# Patient Record
Sex: MALE | Race: WHITE | NOT HISPANIC OR LATINO | Employment: OTHER | URBAN - NONMETROPOLITAN AREA
[De-identification: names, ages, dates, MRNs, and addresses within clinical notes are randomized per-mention and may not be internally consistent; named-entity substitution may affect disease eponyms.]

---

## 2018-07-18 ENCOUNTER — TRANSCRIBE ORDERS (OUTPATIENT)
Dept: ADMINISTRATIVE | Facility: HOSPITAL | Age: 68
End: 2018-07-18

## 2018-07-18 DIAGNOSIS — R20.8 OTHER DISTURBANCES OF SKIN SENSATION: Primary | ICD-10-CM

## 2018-08-23 ENCOUNTER — HOSPITAL ENCOUNTER (OUTPATIENT)
Dept: NEUROLOGY | Facility: HOSPITAL | Age: 68
End: 2018-08-23

## 2018-08-29 ENCOUNTER — HOSPITAL ENCOUNTER (OUTPATIENT)
Dept: NEUROLOGY | Facility: HOSPITAL | Age: 68
Discharge: HOME | End: 2018-08-29
Admitting: FAMILY MEDICINE

## 2018-08-29 DIAGNOSIS — R20.8 OTHER DISTURBANCES OF SKIN SENSATION: ICD-10-CM

## 2018-08-29 PROCEDURE — 95886 MUSC TEST DONE W/N TEST COMP: CPT

## 2018-08-29 PROCEDURE — 95886 MUSC TEST DONE W/N TEST COMP: CPT | Performed by: PSYCHIATRY & NEUROLOGY

## 2018-08-29 PROCEDURE — 95911 NRV CNDJ TEST 9-10 STUDIES: CPT

## 2018-08-29 PROCEDURE — 95911 NRV CNDJ TEST 9-10 STUDIES: CPT | Performed by: PSYCHIATRY & NEUROLOGY

## 2018-12-26 ENCOUNTER — OFFICE VISIT (OUTPATIENT)
Dept: CARDIOLOGY | Facility: CLINIC | Age: 68
End: 2018-12-26

## 2018-12-26 VITALS
OXYGEN SATURATION: 99 % | HEIGHT: 73 IN | DIASTOLIC BLOOD PRESSURE: 72 MMHG | SYSTOLIC BLOOD PRESSURE: 120 MMHG | WEIGHT: 197 LBS | BODY MASS INDEX: 26.11 KG/M2 | HEART RATE: 70 BPM

## 2018-12-26 DIAGNOSIS — R61 NIGHT SWEATS: ICD-10-CM

## 2018-12-26 DIAGNOSIS — Z82.49 FAMILY HISTORY OF EARLY CAD: ICD-10-CM

## 2018-12-26 DIAGNOSIS — I39 ENDOCARDITIS AND HEART VALVE DISORDERS IN DISEASES CLASSIFIED ELSEWHERE: ICD-10-CM

## 2018-12-26 DIAGNOSIS — I10 ESSENTIAL HYPERTENSION: Primary | ICD-10-CM

## 2018-12-26 DIAGNOSIS — R05.9 COUGH: ICD-10-CM

## 2018-12-26 DIAGNOSIS — B27.80 OTHER INFECTIOUS MONONUCLEOSIS WITHOUT COMPLICATION: ICD-10-CM

## 2018-12-26 DIAGNOSIS — R50.9 FEVER CHILLS: ICD-10-CM

## 2018-12-26 PROCEDURE — 93000 ELECTROCARDIOGRAM COMPLETE: CPT | Performed by: INTERNAL MEDICINE

## 2018-12-26 PROCEDURE — 99204 OFFICE O/P NEW MOD 45 MIN: CPT | Performed by: INTERNAL MEDICINE

## 2018-12-26 RX ORDER — ACYCLOVIR 400 MG/1
400 TABLET ORAL 2 TIMES DAILY
COMMUNITY

## 2018-12-26 RX ORDER — DORZOLAMIDE HCL 20 MG/ML
1 SOLUTION/ DROPS OPHTHALMIC 3 TIMES DAILY
COMMUNITY

## 2018-12-26 RX ORDER — LISINOPRIL 20 MG/1
20 TABLET ORAL DAILY
COMMUNITY
End: 2020-05-08 | Stop reason: HOSPADM

## 2018-12-26 RX ORDER — FLUOROMETHOLONE 0.1 %
1 SUSPENSION, DROPS(FINAL DOSAGE FORM)(ML) OPHTHALMIC (EYE) WEEKLY
COMMUNITY

## 2018-12-26 NOTE — PROGRESS NOTES
Korey Ruelas  [id number]  1950  68 y.o.  male    Referring Provider: Jerica Schmitt MD    Reason for Follow-up Visit:  Initial visit for fever and chills  Essential Hypertension  Recent CXR normal per patient    Subjective    Mild chronic exertional shortness of breath on exertion relieved with rest  No significant cough or wheezing  Going on for several months    No palpitations  No associated chest pain  No significant pedal edema  Non exertional chest pain   Feels like a band  Family History   Problem Relation Age of Onset   • Heart disease Mother    • Heart disease Father        fever or chills and night sweats x 6 weeks  No significant expectoration  No hemoptysis  No presyncope or syncope       History of present illness:  Korey Ruelas is a 68 y.o. yo male with history of  Essential Hypertension   who presents today for   Chief Complaint   Patient presents with   • Chest Pain     NEW PT - RECENT ECHO/CXR - ACTIVE MONO INFECTION   • Headache   • Night Sweats   • Dizziness   .    History  Past Medical History:   Diagnosis Date   • Chest tightness    • Hypertension    • Kidney stones    • Mono exposure     CURRENT INFECTION   • Shingles    ,   Past Surgical History:   Procedure Laterality Date   • CATARACT EXTRACTION     • KNEE SURGERY      1974   ,   Family History   Problem Relation Age of Onset   • Heart disease Mother    • Heart disease Father    ,   Social History     Tobacco Use   • Smoking status: Never Smoker   • Smokeless tobacco: Never Used   Substance Use Topics   • Alcohol use: Yes   • Drug use: Defer   ,     Medications  Current Outpatient Medications   Medication Sig Dispense Refill   • acyclovir (ZOVIRAX) 400 MG tablet Take 400 mg by mouth 2 (Two) Times a Day. Take no more than 5 doses a day.     • Cyanocobalamin (B-12 PO) Take  by mouth Daily.     • dorzolamide (TRUSOPT) 2 % ophthalmic solution 1 drop 3 (Three) Times a Day.     • fluorometholone (FML) 0.1 % ophthalmic suspension 1  "drop Every 4 (Four) Hours.     • lisinopril (PRINIVIL,ZESTRIL) 20 MG tablet Take 20 mg by mouth Daily.     • NIACIN PO Take  by mouth Daily.       No current facility-administered medications for this visit.        Allergies:  Patient has no known allergies.    Review of Systems  Review of Systems   Constitution: Positive for chills, fever, weakness, malaise/fatigue and night sweats.   HENT: Negative.    Eyes: Negative.    Cardiovascular: Positive for chest pain and dyspnea on exertion. Negative for claudication, cyanosis, irregular heartbeat, leg swelling, near-syncope, orthopnea, palpitations, paroxysmal nocturnal dyspnea and syncope.   Respiratory: Negative.    Endocrine: Negative.    Hematologic/Lymphatic: Negative.    Skin: Negative.    Gastrointestinal: Negative for anorexia.   Genitourinary: Negative.    Psychiatric/Behavioral: Negative.        Objective     Physical Exam:  /72   Pulse 70   Ht 185.4 cm (73\")   Wt 89.4 kg (197 lb)   SpO2 99%   BMI 25.99 kg/m²     Physical Exam   Constitutional: He appears well-developed.   HENT:   Head: Normocephalic.   Neck: Normal carotid pulses and no JVD present. No tracheal tenderness present. Carotid bruit is not present. No tracheal deviation and no edema present.   Cardiovascular: Regular rhythm, normal heart sounds and normal pulses.   Pulmonary/Chest: Effort normal. No stridor.   Abdominal: Soft.   Neurological: He is alert. He has normal strength. No cranial nerve deficit or sensory deficit.   Skin: Skin is warm.   Psychiatric: He has a normal mood and affect. His speech is normal and behavior is normal.       Results Review:       ECG 12 Lead  Date/Time: 12/26/2018 10:18 AM  Performed by: Boo Wylie MD  Authorized by: Boo Wylie MD   Comparison: not compared with previous ECG   Rhythm: sinus rhythm  Rate: normal  Conduction: conduction normal  ST Segments: ST segments normal  T Waves: T waves normal  QRS axis: normal  Other: no other " "findings  Clinical impression: normal ECG            Assessment/Plan   Korey was seen today for chest pain, headache, night sweats and dizziness.    Diagnoses and all orders for this visit:    Essential hypertension    Night sweats  -     MILTON - Diagnostic    Cough    Fever chills  -     ECG 12 Lead  -     MILTON - Diagnostic    Other infectious mononucleosis without complication    Family history of early CAD  -     ECG 12 Lead             Plan          Orders Placed This Encounter   Procedures   • MILTON - Diagnostic   • ECG 12 Lead     This order was created via procedure documentation     Recommend ID consult   He will talk to primary now  He declined stress test  In past nuclear stress test in [place] was normal    xxxxxxxxxxxxxxxxxxxxxxxxxxxxxxxxxxxxxxxxxxxxxxxxxxxxxxxxxxxxxxxxxxxxxxxxxxxxxxxxxxxxxxxxxxxxxxxxxxxxxxxxxxxxxxxxxxxxxxxxxxxxxxxxxxxxxxxxxxxxxxxxxxxxxxxxxxxxxxxxxxxxxxxxxxxxxxxxxxxxxxxxxxxxxxxxxxxxxxxxxxxxxxxxxxxxxxxxxxxxxxxxxxxxxxxxxxxxxxxxxxxxxxxx  Health maintenance    Low salt/ HTN/ Heart healthy carbohydrate restricted cardiac diet as applicable to this patient's current diagnoses.   This handout has relevant information regarding shopping for food, preparing meals, what to eat at restaurants, tracking of food intake, information regarding sodium intake and salt content, how to read food labels, knowing what to eat, tips reagarding physical activity, calorie count and calorie expenditure. What foods to avoid. Information regarding alcoholic drinks along with \"good\" and \"bad\" fats.  Reiterated prior recommendations     The patient is advised to reduce or avoid caffeine or other cardiac stimulants.     The patient was advised that NSAID-type medications have three  very important potential side effects: cardiovascular complications, gastrointestinal irritation including hemorrhage and renal injuries.  Do not use anti-inflammatories such as Motrin/ibuprofen, Alleve/naprosyn, Mobic and like medications " Use Tylenol instead.The patient expresses understanding of these issues and questions were answered.   Monitor for any signs of bleeding including red or dark stools. Fall precautions.       Monitor for any signs of bleeding including red or dark stools. Fall precautions.   Patient is asked to monitor BP at home or work, several times per month and return with written values at next office visit.     Advised staying uptodate with immunizations per established standard guidelines.      Offered to give patient  a copy of my notes and relevant tests/ prior ECG etc for the patient to review and follow specific advise and relevant findings if any, prognosis, along with my current and future plans.      Questions were encouraged, asked and answered to the patient's  understanding and satisfaction. Questions if any regarding current medications and side effects, need for refills and importance of compliance to medications stressed.    Reviewed available prior notes, consults, prior visits, laboratory findings, radiology and cardiology relevant reports. Updated chart as applicable. I have reviewed the patient's medical history in detail and updated the computerized patient record as relevant.      Updated patient regarding any new or relevant abnormalities on review of records or any new findings on physical exam. Mentioned to patient about purpose of visit and desirable health short and long term goals and objectives.        xxxxxxxxxxxxxxxxxxxxxxxxxxxxxxxxxxxxxxxxxxxxxxxxxxxxxxxxxxxxxxxxxxxxxxxxxxxxxxxxxxxxxxxxxxxxxxxxxxxxxxxxxxxxxxxxxxxxxxxxxxxxxxxxxxxxxxxxxxxxxxxxxxxxxxxxxxxxxxxxxxxxxxxxxxxxxxxxxxxxxxxxxxxxxxxxxxxxxxxxxxxxxxxxxxxxxxxxxxxxxxxxxxxxxxxxxxxxxxxxxxxxxxxx    Return if symptoms worsen or fail to improve.  He declined a follow up  He wants to think about MILTON and let us know

## 2019-01-28 DIAGNOSIS — R50.9 FEVER, UNSPECIFIED FEVER CAUSE: Primary | ICD-10-CM

## 2019-01-28 DIAGNOSIS — I33.0: ICD-10-CM

## 2019-01-29 ENCOUNTER — HOSPITAL ENCOUNTER (OUTPATIENT)
Dept: CARDIOLOGY | Facility: HOSPITAL | Age: 69
Discharge: HOME | End: 2019-01-29
Attending: INTERNAL MEDICINE | Admitting: INTERNAL MEDICINE

## 2019-01-29 ENCOUNTER — ANESTHESIA EVENT (OUTPATIENT)
Dept: CARDIOLOGY | Facility: HOSPITAL | Age: 69
End: 2019-01-29

## 2019-01-29 ENCOUNTER — ANESTHESIA (OUTPATIENT)
Dept: CARDIOLOGY | Facility: HOSPITAL | Age: 69
End: 2019-01-29

## 2019-01-29 VITALS
TEMPERATURE: 99 F | BODY MASS INDEX: 25.03 KG/M2 | RESPIRATION RATE: 14 BRPM | HEIGHT: 74 IN | WEIGHT: 195 LBS | DIASTOLIC BLOOD PRESSURE: 72 MMHG | SYSTOLIC BLOOD PRESSURE: 105 MMHG | HEART RATE: 78 BPM | OXYGEN SATURATION: 97 %

## 2019-01-29 VITALS — OXYGEN SATURATION: 100 % | DIASTOLIC BLOOD PRESSURE: 69 MMHG | SYSTOLIC BLOOD PRESSURE: 136 MMHG

## 2019-01-29 DIAGNOSIS — I33.0: ICD-10-CM

## 2019-01-29 DIAGNOSIS — R50.9 FEVER, UNSPECIFIED FEVER CAUSE: ICD-10-CM

## 2019-01-29 PROCEDURE — 93312 ECHO TRANSESOPHAGEAL: CPT

## 2019-01-29 PROCEDURE — 93325 DOPPLER ECHO COLOR FLOW MAPG: CPT

## 2019-01-29 PROCEDURE — 93312 ECHO TRANSESOPHAGEAL: CPT | Performed by: INTERNAL MEDICINE

## 2019-01-29 PROCEDURE — 25010000002 PROPOFOL 10 MG/ML EMULSION: Performed by: NURSE ANESTHETIST, CERTIFIED REGISTERED

## 2019-01-29 PROCEDURE — 93320 DOPPLER ECHO COMPLETE: CPT | Performed by: INTERNAL MEDICINE

## 2019-01-29 PROCEDURE — 93320 DOPPLER ECHO COMPLETE: CPT

## 2019-01-29 PROCEDURE — 93325 DOPPLER ECHO COLOR FLOW MAPG: CPT | Performed by: INTERNAL MEDICINE

## 2019-01-29 RX ORDER — SODIUM CHLORIDE 9 MG/ML
INJECTION, SOLUTION INTRAVENOUS CONTINUOUS PRN
Status: DISCONTINUED | OUTPATIENT
Start: 2019-01-29 | End: 2019-01-29 | Stop reason: SURG

## 2019-01-29 RX ORDER — PROPOFOL 10 MG/ML
VIAL (ML) INTRAVENOUS AS NEEDED
Status: DISCONTINUED | OUTPATIENT
Start: 2019-01-29 | End: 2019-01-29 | Stop reason: SURG

## 2019-01-29 RX ADMIN — PROPOFOL 80 MG: 10 INJECTION, EMULSION INTRAVENOUS at 14:39

## 2019-01-29 RX ADMIN — PROPOFOL 160 MG: 10 INJECTION, EMULSION INTRAVENOUS at 14:40

## 2019-01-29 RX ADMIN — SODIUM CHLORIDE: 9 INJECTION, SOLUTION INTRAVENOUS at 14:30

## 2019-01-29 NOTE — ANESTHESIA PREPROCEDURE EVALUATION
Anesthesia Evaluation     Patient summary reviewed and Nursing notes reviewed   no history of anesthetic complications:  NPO Solid Status: > 8 hours  NPO Liquid Status: > 8 hours           Airway   Dental      Pulmonary    (+) shortness of breath,   Cardiovascular   Exercise tolerance: poor (<4 METS)    (+) hypertension,       Neuro/Psych- negative ROS  (-) seizures, TIA, CVA  GI/Hepatic/Renal/Endo    (+)   liver disease (elevated alk phos, ggt, normal liver ultrasound),   (-) no renal disease, diabetes    Musculoskeletal (-) negative ROS    Abdominal    Substance History - negative use     OB/GYN negative ob/gyn ROS         Other        (-) arthritis                  Anesthesia Plan    ASA 2     general   (Pt with recent mono infection, unable to get well and concern for endocarditis. Presents for MILTON. Nightsweat, headaches, stomach pain, chronic fatigue)  intravenous induction   Anesthetic plan, all risks, benefits, and alternatives have been provided, discussed and informed consent has been obtained with: patient.

## 2019-01-29 NOTE — H&P
Risks, benefits and alternatives explained. The patient understands and wishes to proceed.   transesophageal echo for subacute endocarditis       LOS: 0 days   Patient Care Team:  Joe Mata MD as PCP - General (Family Medicine)  Boo Wylie MD as Cardiologist (Cardiology)  Joe Mata MD as Referring Physician (Family Medicine)    Chief Complaint: Fever    See office notes  Shortness of breath    Subjective    No chest pain   No excessive shortness of breath  No new complaints  Anxious  Generalized weakness  Easy fatigability  Fever    Telemetry: no malignant arrhythmia. No significant pauses.    Review of Systems     Constitutional: No chills   Has fatigue   Has fever.     HENT: Negative.    Eyes: Negative.      Respiratory: Negative for cough,   No chest wall soreness,   Shortness of breath,   no wheezing, no stridor.      Cardiovascular: Negative for chest pain,   No palpitations   No significant  leg swelling.     Gastrointestinal: Negative for abdominal distention,  No abdominal pain,   No blood in stool,   No constipation,   No diarrhea,   No nausea   No vomiting.     Endocrine: Negative.    Genitourinary: Negative for difficulty urinating, dysuria, flank pain and hematuria.     Musculoskeletal: Negative.    Skin: Negative for rash and wound.   Allergic/Immunologic: Negative.      Neurological: Negative for dizziness, syncope, weakness,   No light-headedness  No  headaches.     Hematological: Does not bruise/bleed easily.     Psychiatric/Behavioral: Negative for agitation or behavioral problems,   No confusion,   the patient is  nervous/anxious.       History:   Past Medical History:   Diagnosis Date   • Chest tightness    • Hypertension    • Kidney stones    • Mono exposure     CURRENT INFECTION   • Shingles      Past Surgical History:   Procedure Laterality Date   • CATARACT EXTRACTION     • KNEE SURGERY      1974     Social History     Socioeconomic History   • Marital status:       Spouse name: Not on file   • Number of children: Not on file   • Years of education: Not on file   • Highest education level: Not on file   Social Needs   • Financial resource strain: Not on file   • Food insecurity - worry: Not on file   • Food insecurity - inability: Not on file   • Transportation needs - medical: Not on file   • Transportation needs - non-medical: Not on file   Occupational History   • Not on file   Tobacco Use   • Smoking status: Never Smoker   • Smokeless tobacco: Never Used   Substance and Sexual Activity   • Alcohol use: Yes   • Drug use: Defer   • Sexual activity: Defer   Other Topics Concern   • Not on file   Social History Narrative   • Not on file     Family History   Problem Relation Age of Onset   • Heart disease Mother    • Heart disease Father        Labs:  WBC No results found for: WBC   HGB No results found for: HGB   HCT No results found for: HCT   Platelets No results found for: PLT   MCV No results found for: MCV         Invalid input(s): LABALBU, PROTNo results found for: CKTOTAL, CKMB, CKMBINDEX, TROPONINI, TROPONINT  PT/INR:  No results found for: PROTIME/No results found for: INR    Imaging Results (last 72 hours)     ** No results found for the last 72 hours. **          Objective     No Known Allergies    Medication Review: Performed  Current Outpatient Medications   Medication Sig Dispense Refill   • acyclovir (ZOVIRAX) 400 MG tablet Take 400 mg by mouth 2 (Two) Times a Day. Take no more than 5 doses a day.     • dorzolamide (TRUSOPT) 2 % ophthalmic solution 1 drop 3 (Three) Times a Day.     • fluorometholone (FML) 0.1 % ophthalmic suspension 1 drop Every 4 (Four) Hours.     • Cyanocobalamin (B-12 PO) Take  by mouth Daily.     • lisinopril (PRINIVIL,ZESTRIL) 20 MG tablet Take 20 mg by mouth Daily.     • NIACIN PO Take  by mouth Daily.       No current facility-administered medications for this encounter.        Vital Sign Min/Max for last 24 hours  Temp  Min:  "99 °F (37.2 °C)  Max: 99 °F (37.2 °C)   BP  Min: 131/80  Max: 131/80   Pulse  Min: 88  Max: 88   Resp  Min: 16  Max: 16   SpO2  Min: 99 %  Max: 99 %   No Data Recorded   Weight  Min: 88.5 kg (195 lb)  Max: 88.5 kg (195 lb)     Flowsheet Rows      First Filed Value   Admission Height  188 cm (74\") Documented at 01/29/2019 1334   Admission Weight  88.5 kg (195 lb) Documented at 01/29/2019 1334          Results for orders placed in visit on 12/26/18   SCANNED - ECHOCARDIOGRAM       Physical Exam:    General Appearance: Awake, alert, in no acute distress  Eyes: Pupils equal and reactive    Ears: Appear intact with no abnormalities noted  Nose: Nares normal, no drainage  Neck: supple, trachea midline, no carotid bruit and no JVD  Back: no kyphosis present,    Lungs: respirations regular, respirations even and respirations unlabored    Heart: normal S1, S2,  2/6 pansystolic murmur in the left sternal border,  no rub and no click    Abdomen: normal bowel sounds, no tenderness   Skin: no bleeding, bruising or rash  Extremities: no cyanosis  Psychiatric/Behavioral: Negative for agitation, behavioral problems, confusion, the patient does  appear to be nervous/anxious.          Results Review:   I reviewed the patient's new clinical results.  I reviewed the patient's new imaging results and agree with the interpretation.  I reviewed the patient's other test results and agree with the interpretation  I personally viewed and interpreted the patient's EKG/Telemetry data  Discussed with patient    Reviewed available prior notes, consults, prior visits, laboratory findings, radiology and cardiology relevant reports. Updated chart as applicable. I have reviewed the patient's medical history in detail and updated the computerized patient record as relevant.      Updated patient regarding any new or relevant abnormalities on review of records or any new findings on physical exam. Mentioned to patient about purpose of visit and " desirable health short and long term goals and objectives.     Assessment/Plan     Fever    Plan    transesophageal echo   Risks, benefits and alternatives explained. The patient understands and wishes to proceed.     Boo Wylie MD  01/29/19  1:46 PM    EMR Dragon/Transcription was used to dictate part of this note

## 2019-01-29 NOTE — ANESTHESIA POSTPROCEDURE EVALUATION
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      Fever, unspecified fever cause      (Endocarditis)    Scheduled Providers:  Boo Wylie MD Provider:  Sanford Gregg CRNA    Anesthesia Type:  general ASA Status:  2          Anesthesia Type: general  Last vitals  BP   131/80 (01/29/19 1334)   Temp   99 °F (37.2 °C) (01/29/19 1334)   Pulse   88 (01/29/19 1334)   Resp   16 (01/29/19 1334)     SpO2   99 % (01/29/19 1334)     Post Anesthesia Care and Evaluation    Patient location during evaluation: PACU  Patient participation: complete - patient participated  Level of consciousness: awake and awake and alert  Pain score: 0  Pain management: adequate  Airway patency: patent  Anesthetic complications: No anesthetic complications    Cardiovascular status: acceptable and stable  Respiratory status: acceptable and unassisted  Hydration status: acceptable    Comments: Blood pressure 131/80, pulse 88, temperature 99 °F (37.2 °C), temperature source Temporal, resp. rate 16, height 188 cm (74\"), weight 88.5 kg (195 lb), SpO2 99 %.      "

## 2019-01-30 LAB
BH CV ECHO MEAS - BSA(HAYCOCK): 2.2 M^2
BH CV ECHO MEAS - BSA: 2.1 M^2
BH CV ECHO MEAS - BZI_BMI: 25 KILOGRAMS/M^2
BH CV ECHO MEAS - BZI_METRIC_HEIGHT: 188 CM
BH CV ECHO MEAS - BZI_METRIC_WEIGHT: 88.5 KG
LV EF 2D ECHO EST: 60 %

## 2019-03-05 ENCOUNTER — TRANSCRIBE ORDERS (OUTPATIENT)
Dept: LAB | Facility: HOSPITAL | Age: 69
End: 2019-03-05

## 2019-03-05 ENCOUNTER — LAB (OUTPATIENT)
Dept: LAB | Facility: HOSPITAL | Age: 69
End: 2019-03-05

## 2019-03-05 DIAGNOSIS — R79.89 ELEVATED LIVER FUNCTION TESTS: ICD-10-CM

## 2019-03-05 DIAGNOSIS — R50.9 FEVER, UNSPECIFIED: ICD-10-CM

## 2019-03-05 DIAGNOSIS — R50.9 FEVER, UNSPECIFIED: Primary | ICD-10-CM

## 2019-03-05 LAB
ALBUMIN SERPL-MCNC: 4.6 G/DL (ref 3.5–5)
ALBUMIN/GLOB SERPL: 1.2 G/DL (ref 1.1–2.5)
ALP SERPL-CCNC: 398 U/L (ref 24–120)
ALT SERPL W P-5'-P-CCNC: 44 U/L (ref 0–54)
ANION GAP SERPL CALCULATED.3IONS-SCNC: 8 MMOL/L (ref 4–13)
AST SERPL-CCNC: 46 U/L (ref 7–45)
BILIRUB SERPL-MCNC: 0.8 MG/DL (ref 0.1–1)
BUN BLD-MCNC: 17 MG/DL (ref 5–21)
BUN/CREAT SERPL: 15.9 (ref 7–25)
CALCIUM SPEC-SCNC: 10.1 MG/DL (ref 8.4–10.4)
CHLORIDE SERPL-SCNC: 103 MMOL/L (ref 98–110)
CHROMATIN AB SERPL-ACNC: <8.6 IU/ML (ref 0–11.9)
CO2 SERPL-SCNC: 29 MMOL/L (ref 24–31)
CREAT BLD-MCNC: 1.07 MG/DL (ref 0.5–1.4)
CRP SERPL-MCNC: 0.87 MG/DL (ref 0–0.99)
DEPRECATED RDW RBC AUTO: 48.9 FL (ref 40–54)
EOSINOPHIL # BLD MANUAL: 0.04 10*3/MM3 (ref 0–0.7)
EOSINOPHIL NFR BLD MANUAL: 1 % (ref 0–4)
ERYTHROCYTE [DISTWIDTH] IN BLOOD BY AUTOMATED COUNT: 14.8 % (ref 12–15)
ERYTHROCYTE [SEDIMENTATION RATE] IN BLOOD: 8 MM/HR (ref 0–15)
FERRITIN SERPL-MCNC: 138 NG/ML (ref 17.9–464)
GFR SERPL CREATININE-BSD FRML MDRD: 69 ML/MIN/1.73
GIANT PLATELETS: ABNORMAL
GLOBULIN UR ELPH-MCNC: 3.7 GM/DL
GLUCOSE BLD-MCNC: 94 MG/DL (ref 70–100)
HCT VFR BLD AUTO: 45.1 % (ref 40–52)
HGB BLD-MCNC: 14.5 G/DL (ref 14–18)
LYMPHOCYTES # BLD MANUAL: 0.45 10*3/MM3 (ref 0.72–4.86)
LYMPHOCYTES NFR BLD MANUAL: 11 % (ref 15–45)
LYMPHOCYTES NFR BLD MANUAL: 7 % (ref 4–12)
MCH RBC QN AUTO: 29 PG (ref 28–32)
MCHC RBC AUTO-ENTMCNC: 32.2 G/DL (ref 33–36)
MCV RBC AUTO: 90.2 FL (ref 82–95)
MONOCYTES # BLD AUTO: 0.29 10*3/MM3 (ref 0.19–1.3)
NEUTROPHILS # BLD AUTO: 3.2 10*3/MM3 (ref 1.87–8.4)
NEUTROPHILS NFR BLD MANUAL: 71 % (ref 39–78)
NEUTS BAND NFR BLD MANUAL: 7 % (ref 0–10)
PLATELET # BLD AUTO: 306 10*3/MM3 (ref 130–400)
PMV BLD AUTO: 10 FL (ref 6–12)
POTASSIUM BLD-SCNC: 4.7 MMOL/L (ref 3.5–5.3)
PROT SERPL-MCNC: 8.3 G/DL (ref 6.3–8.7)
RBC # BLD AUTO: 5 10*6/MM3 (ref 4.8–5.9)
RBC MORPH BLD: NORMAL
SODIUM BLD-SCNC: 140 MMOL/L (ref 135–145)
T4 FREE SERPL-MCNC: 1.11 NG/DL (ref 0.78–2.19)
TSH SERPL DL<=0.05 MIU/L-ACNC: 1.16 MIU/ML (ref 0.47–4.68)
VARIANT LYMPHS NFR BLD MANUAL: 3 % (ref 0–5)
WBC MORPH BLD: NORMAL
WBC NRBC COR # BLD: 4.1 10*3/MM3 (ref 4.8–10.8)

## 2019-03-05 PROCEDURE — 86664 EPSTEIN-BARR NUCLEAR ANTIGEN: CPT | Performed by: INTERNAL MEDICINE

## 2019-03-05 PROCEDURE — 86668 FRANCISELLA TULARENSIS: CPT | Performed by: INTERNAL MEDICINE

## 2019-03-05 PROCEDURE — 86140 C-REACTIVE PROTEIN: CPT | Performed by: INTERNAL MEDICINE

## 2019-03-05 PROCEDURE — 84443 ASSAY THYROID STIM HORMONE: CPT | Performed by: INTERNAL MEDICINE

## 2019-03-05 PROCEDURE — 85651 RBC SED RATE NONAUTOMATED: CPT | Performed by: INTERNAL MEDICINE

## 2019-03-05 PROCEDURE — 86431 RHEUMATOID FACTOR QUANT: CPT | Performed by: INTERNAL MEDICINE

## 2019-03-05 PROCEDURE — 86663 EPSTEIN-BARR ANTIBODY: CPT | Performed by: INTERNAL MEDICINE

## 2019-03-05 PROCEDURE — 80053 COMPREHEN METABOLIC PANEL: CPT | Performed by: INTERNAL MEDICINE

## 2019-03-05 PROCEDURE — 84439 ASSAY OF FREE THYROXINE: CPT | Performed by: INTERNAL MEDICINE

## 2019-03-05 PROCEDURE — 86644 CMV ANTIBODY: CPT | Performed by: INTERNAL MEDICINE

## 2019-03-05 PROCEDURE — 85027 COMPLETE CBC AUTOMATED: CPT | Performed by: INTERNAL MEDICINE

## 2019-03-05 PROCEDURE — 36415 COLL VENOUS BLD VENIPUNCTURE: CPT | Performed by: INTERNAL MEDICINE

## 2019-03-05 PROCEDURE — 86611 BARTONELLA ANTIBODY: CPT | Performed by: INTERNAL MEDICINE

## 2019-03-05 PROCEDURE — 86645 CMV ANTIBODY IGM: CPT | Performed by: INTERNAL MEDICINE

## 2019-03-05 PROCEDURE — 85060 BLOOD SMEAR INTERPRETATION: CPT | Performed by: INTERNAL MEDICINE

## 2019-03-05 PROCEDURE — 85007 BL SMEAR W/DIFF WBC COUNT: CPT | Performed by: INTERNAL MEDICINE

## 2019-03-05 PROCEDURE — 87471 BARTONELLA DNA AMP PROBE: CPT | Performed by: INTERNAL MEDICINE

## 2019-03-05 PROCEDURE — 82728 ASSAY OF FERRITIN: CPT | Performed by: INTERNAL MEDICINE

## 2019-03-05 PROCEDURE — 86038 ANTINUCLEAR ANTIBODIES: CPT | Performed by: INTERNAL MEDICINE

## 2019-03-05 PROCEDURE — 87040 BLOOD CULTURE FOR BACTERIA: CPT | Performed by: INTERNAL MEDICINE

## 2019-03-05 PROCEDURE — 86665 EPSTEIN-BARR CAPSID VCA: CPT | Performed by: INTERNAL MEDICINE

## 2019-03-06 LAB
CMV IGG SERPL IA-ACNC: 1.3 U/ML (ref 0–0.59)
CMV IGM SERPL IA-ACNC: <30 AU/ML (ref 0–29.9)
CYTOLOGIST CVX/VAG CYTO: NORMAL
EBV EA IGG SER-ACNC: <9 U/ML (ref 0–8.9)
EBV NA IGG SER IA-ACNC: 98.2 U/ML (ref 0–17.9)
EBV VCA IGG SER-ACNC: 95.3 U/ML (ref 0–17.9)
EBV VCA IGM SER-ACNC: <36 U/ML (ref 0–35.9)
INTERPRETATION: ABNORMAL
PATH INTERP BLD-IMP: NORMAL

## 2019-03-07 LAB
ANA NUCLEOLAR TITR SER: ABNORMAL {TITER}
ANA SER QL IA: POSITIVE
ANA SPECKLED TITR SER: ABNORMAL {TITER}
Lab: ABNORMAL
SPINDLE APPARATUS PATTERN: ABNORMAL

## 2019-03-08 ENCOUNTER — TRANSCRIBE ORDERS (OUTPATIENT)
Dept: ADMINISTRATIVE | Facility: HOSPITAL | Age: 69
End: 2019-03-08

## 2019-03-08 ENCOUNTER — APPOINTMENT (OUTPATIENT)
Dept: LAB | Facility: HOSPITAL | Age: 69
End: 2019-03-08

## 2019-03-08 DIAGNOSIS — R50.9 FEVER OF UNKNOWN ORIGIN: Primary | ICD-10-CM

## 2019-03-08 DIAGNOSIS — R79.89 ELEVATED LIVER FUNCTION TESTS: ICD-10-CM

## 2019-03-08 LAB
B HENSELAE IGG TITR SER IF: NEGATIVE TITER
B HENSELAE IGM TITR SER IF: NEGATIVE TITER
B QUINTANA IGG TITR SER: NEGATIVE TITER
B QUINTANA IGM TITR SER: NEGATIVE TITER
F TULAR IGG TITR SER: NEGATIVE {TITER}
F TULAR IGM TITR SER: NEGATIVE {TITER}
REF LAB TEST METHOD: NORMAL

## 2019-03-08 PROCEDURE — 36415 COLL VENOUS BLD VENIPUNCTURE: CPT

## 2019-03-08 PROCEDURE — 86638 Q FEVER ANTIBODY: CPT | Performed by: INTERNAL MEDICINE

## 2019-03-10 LAB
BACTERIA SPEC AEROBE CULT: NORMAL
BACTERIA SPEC AEROBE CULT: NORMAL

## 2019-03-11 LAB — B HENSELAE DNA SPEC QL NAA+PROBE: NEGATIVE

## 2019-03-13 ENCOUNTER — TRANSCRIBE ORDERS (OUTPATIENT)
Dept: ADMINISTRATIVE | Facility: HOSPITAL | Age: 69
End: 2019-03-13

## 2019-03-13 ENCOUNTER — APPOINTMENT (OUTPATIENT)
Dept: LAB | Facility: HOSPITAL | Age: 69
End: 2019-03-13

## 2019-03-13 DIAGNOSIS — R79.89 ELEVATED LIVER FUNCTION TESTS: ICD-10-CM

## 2019-03-13 DIAGNOSIS — R68.2 DRY MOUTH: ICD-10-CM

## 2019-03-13 DIAGNOSIS — R50.9 FEVER OF UNKNOWN ORIGIN: Primary | ICD-10-CM

## 2019-03-13 LAB
ALBUMIN SERPL-MCNC: 4.5 G/DL (ref 3.5–5)
ALBUMIN/GLOB SERPL: 1.4 G/DL (ref 1.1–2.5)
ALP SERPL-CCNC: 253 U/L (ref 24–120)
ALT SERPL W P-5'-P-CCNC: 34 U/L (ref 0–54)
ANION GAP SERPL CALCULATED.3IONS-SCNC: 9 MMOL/L (ref 4–13)
AST SERPL-CCNC: 40 U/L (ref 7–45)
BILIRUB SERPL-MCNC: 0.7 MG/DL (ref 0.1–1)
BUN BLD-MCNC: 16 MG/DL (ref 5–21)
BUN/CREAT SERPL: 15.5 (ref 7–25)
CALCIUM SPEC-SCNC: 10.5 MG/DL (ref 8.4–10.4)
CHLORIDE SERPL-SCNC: 104 MMOL/L (ref 98–110)
CO2 SERPL-SCNC: 29 MMOL/L (ref 24–31)
CREAT BLD-MCNC: 1.03 MG/DL (ref 0.5–1.4)
GFR SERPL CREATININE-BSD FRML MDRD: 72 ML/MIN/1.73
GLOBULIN UR ELPH-MCNC: 3.3 GM/DL
GLUCOSE BLD-MCNC: 90 MG/DL (ref 70–100)
POTASSIUM BLD-SCNC: 4.8 MMOL/L (ref 3.5–5.3)
PROT SERPL-MCNC: 7.8 G/DL (ref 6.3–8.7)
REF LAB TEST METHOD: NORMAL
SODIUM BLD-SCNC: 142 MMOL/L (ref 135–145)

## 2019-03-13 PROCEDURE — 86235 NUCLEAR ANTIGEN ANTIBODY: CPT | Performed by: INTERNAL MEDICINE

## 2019-03-13 PROCEDURE — 36415 COLL VENOUS BLD VENIPUNCTURE: CPT

## 2019-03-13 PROCEDURE — 80053 COMPREHEN METABOLIC PANEL: CPT | Performed by: INTERNAL MEDICINE

## 2019-03-13 PROCEDURE — 83516 IMMUNOASSAY NONANTIBODY: CPT | Performed by: INTERNAL MEDICINE

## 2019-03-14 LAB
DEPRECATED MITOCHONDRIA M2 IGG SER-ACNC: <20 UNITS (ref 0–20)
ENA SS-A AB SER-ACNC: <0.2 AI (ref 0–0.9)
ENA SS-B AB SER-ACNC: <0.2 AI (ref 0–0.9)

## 2019-09-25 ENCOUNTER — LAB (OUTPATIENT)
Dept: LAB | Facility: HOSPITAL | Age: 69
End: 2019-09-25

## 2019-09-25 ENCOUNTER — OFFICE VISIT (OUTPATIENT)
Dept: GASTROENTEROLOGY | Facility: CLINIC | Age: 69
End: 2019-09-25

## 2019-09-25 VITALS
HEIGHT: 74 IN | SYSTOLIC BLOOD PRESSURE: 120 MMHG | OXYGEN SATURATION: 98 % | WEIGHT: 204 LBS | DIASTOLIC BLOOD PRESSURE: 74 MMHG | HEART RATE: 71 BPM | TEMPERATURE: 98 F | BODY MASS INDEX: 26.18 KG/M2

## 2019-09-25 DIAGNOSIS — R74.8 ELEVATED ALKALINE PHOSPHATASE LEVEL: ICD-10-CM

## 2019-09-25 DIAGNOSIS — R10.31 RIGHT LOWER QUADRANT ABDOMINAL PAIN: Primary | ICD-10-CM

## 2019-09-25 DIAGNOSIS — R79.89 ELEVATED LFTS: ICD-10-CM

## 2019-09-25 PROCEDURE — 99214 OFFICE O/P EST MOD 30 MIN: CPT | Performed by: NURSE PRACTITIONER

## 2019-09-25 PROCEDURE — 36415 COLL VENOUS BLD VENIPUNCTURE: CPT

## 2019-09-25 PROCEDURE — 84080 ASSAY ALKALINE PHOSPHATASES: CPT | Performed by: NURSE PRACTITIONER

## 2019-09-25 PROCEDURE — 84075 ASSAY ALKALINE PHOSPHATASE: CPT | Performed by: NURSE PRACTITIONER

## 2019-09-25 PROCEDURE — 83516 IMMUNOASSAY NONANTIBODY: CPT | Performed by: NURSE PRACTITIONER

## 2019-09-25 RX ORDER — ASPIRIN 81 MG/1
81 TABLET ORAL AS NEEDED
COMMUNITY
End: 2020-08-13

## 2019-09-25 RX ORDER — MELATONIN
1000 DAILY
COMMUNITY

## 2019-09-25 NOTE — PROGRESS NOTES
Chief Complaint   Patient presents with   • Abdominal Pain     elevated ggt has labs to review liver enzymes high was sick 4 months last year night sweats fever cought fatigue       PCP: Joe Mata MD  REFER: No ref. provider found    Subjective     HPI    Referred to GI for abdominal pain, elevated GGT.  He has a history of positive alk phos.  RIVERA positive 3/5/19.  He was ill end of 2018 first part of 2019.  He had fevers, night sweats, nausea  abdominal pain.  Cause was never identified.  During lab work for illness he had elevation in alk phos ranging from 398-140.    GGT as high as 626.  AST as high as 5 and ALT as high as 56.   Symptoms resolved without treatment.   Continues to experience occasional RLQ abdominal pain.  Bowels currently described as moving without difficulty.  No bleeding.  Weight stable.      He has been evaluated by Dr Justyn Moncada in March 2019. Negative rheumatology    Labs 8/30/19-  ALT 16, AST 19, Alk PHos 80      Past Medical History:   Diagnosis Date   • Chest tightness    • Hypertension    • Kidney stones    • Mono exposure     CURRENT INFECTION   • Shingles        Past Surgical History:   Procedure Laterality Date   • CATARACT EXTRACTION     • KNEE SURGERY      1974       Outpatient Medications Marked as Taking for the 9/25/19 encounter (Office Visit) with Sanford Barney APRN   Medication Sig Dispense Refill   • acyclovir (ZOVIRAX) 400 MG tablet Take 400 mg by mouth 2 (Two) Times a Day. Take no more than 5 doses a day.     • aspirin 81 MG EC tablet Take 81 mg by mouth As Needed.     • cholecalciferol (VITAMIN D3) 1000 units tablet Take 1,000 Units by mouth Daily.     • lisinopril (PRINIVIL,ZESTRIL) 20 MG tablet Take 20 mg by mouth Daily.         No Known Allergies    Social History     Socioeconomic History   • Marital status:      Spouse name: Not on file   • Number of children: Not on file   • Years of education: Not on file   • Highest education level:  "Not on file   Tobacco Use   • Smoking status: Never Smoker   • Smokeless tobacco: Never Used   Substance and Sexual Activity   • Alcohol use: Yes     Comment: 1-2 drinks nightly   • Drug use: No   • Sexual activity: Defer       Family History   Problem Relation Age of Onset   • Heart disease Mother    • Heart disease Father    • Cirrhosis Sister    • Colon polyps Neg Hx        Review of Systems   Constitutional: Negative for fatigue, fever and unexpected weight change.   HENT: Negative for hearing loss, sore throat and voice change.    Eyes: Negative for visual disturbance.   Respiratory: Negative for cough, shortness of breath and wheezing.    Cardiovascular: Negative for chest pain and palpitations.   Gastrointestinal: Positive for abdominal pain. Negative for blood in stool and vomiting.   Endocrine: Negative for polydipsia and polyuria.   Genitourinary: Negative for difficulty urinating, dysuria, hematuria and urgency.   Musculoskeletal: Negative for joint swelling and myalgias.   Skin: Negative for color change, rash and wound.   Neurological: Negative for dizziness, tremors, seizures and syncope.   Hematological: Does not bruise/bleed easily.   Psychiatric/Behavioral: Negative for agitation and confusion. The patient is not nervous/anxious.        Objective     Vitals:    09/25/19 1336   BP: 120/74   Pulse: 71   Temp: 98 °F (36.7 °C)   SpO2: 98%   Weight: 92.5 kg (204 lb)   Height: 188 cm (74\")     Body mass index is 26.19 kg/m².    Physical Exam   Constitutional: He is oriented to person, place, and time. He appears well-developed and well-nourished. He is cooperative.   HENT:   Head: Normocephalic and atraumatic.   Eyes: Conjunctivae are normal. Pupils are equal, round, and reactive to light. No scleral icterus.   Neck: Normal range of motion. Neck supple. No JVD present. No thyroid mass and no thyromegaly present.   Cardiovascular: Normal rate, regular rhythm and normal heart sounds. Exam reveals no gallop " and no friction rub.   No murmur heard.  Pulmonary/Chest: Effort normal and breath sounds normal. No accessory muscle usage. No respiratory distress. He has no wheezes. He has no rales.   Abdominal: Soft. Normal appearance and bowel sounds are normal. He exhibits no distension, no ascites and no mass. There is no hepatosplenomegaly. There is no tenderness. There is no rebound and no guarding.   Musculoskeletal: Normal range of motion. He exhibits no edema or tenderness.     Vascular Status -  His right foot exhibits normal foot vasculature  and no edema. His left foot exhibits normal foot vasculature  and no edema.  Lymphadenopathy:     He has no cervical adenopathy.   Neurological: He is alert and oriented to person, place, and time. He has normal strength. Gait normal.   Skin: Skin is warm, dry and intact. No rash noted.       Imaging Results (most recent)     None          Body mass index is 26.19 kg/m².    Assessment/Plan     Korey was seen today for abdominal pain.    Diagnoses and all orders for this visit:    Right lower quadrant abdominal pain  -     Case Request; Standing  -     Implement Anesthesia Orders Day of Procedure; Standing  -     Obtain Informed Consent; Standing  -     Case Request    Elevated LFTs    Elevated alkaline phosphatase level  -     Alkaline Phosphatase, Isoenzymes; Future  -     Mitochondrial Antibodies, M2    Other orders  -     polyethylene glycol (GoLYTELY) 236 g solution; Take 3,785 mL by mouth 1 (One) Time for 1 dose. Take as directed        COLONOSCOPY WITH ANESTHESIA (N/A)    Case reviewed with Dr Inman     All risks, benefits, alternatives, and indications of colonoscopy procedure have been discussed with the patient. Risks to include perforation of the colon requiring possible surgery or colostomy, risk of bleeding from biopsies or removal of colon tissue, possibility of missing a colon polyp or cancer, or adverse drug reaction.  Benefits to include the diagnosis and  management of disease of the colon and rectum. Alternatives to include barium enema, radiographic evaluation, lab testing or no intervention. Pt verbalizes understanding and agrees to proceed with procedure.      Patient's Body mass index is 26.19 kg/m². BMI is above normal parameters. Recommendations include: no follow up.      There are no Patient Instructions on file for this visit.

## 2019-09-26 PROBLEM — R10.31 RIGHT LOWER QUADRANT ABDOMINAL PAIN: Status: ACTIVE | Noted: 2019-09-26

## 2019-09-26 LAB
ALP BONE CFR SERPL: 39 % (ref 12–68)
ALP INTEST CFR SERPL: 0 % (ref 0–18)
ALP LIVER CFR SERPL: 61 % (ref 13–88)
ALP SERPL-CCNC: 66 IU/L (ref 39–117)
DEPRECATED MITOCHONDRIA M2 IGG SER-ACNC: <20 UNITS (ref 0–20)

## 2019-10-02 ENCOUNTER — OFFICE VISIT (OUTPATIENT)
Dept: UROLOGY | Facility: CLINIC | Age: 69
End: 2019-10-02

## 2019-10-02 VITALS — TEMPERATURE: 98.2 F | WEIGHT: 201.2 LBS | HEIGHT: 74 IN | BODY MASS INDEX: 25.82 KG/M2

## 2019-10-02 DIAGNOSIS — N13.8 BPH WITH URINARY OBSTRUCTION: ICD-10-CM

## 2019-10-02 DIAGNOSIS — R97.20 ELEVATED PROSTATE SPECIFIC ANTIGEN (PSA): Primary | ICD-10-CM

## 2019-10-02 DIAGNOSIS — N40.1 BPH WITH URINARY OBSTRUCTION: ICD-10-CM

## 2019-10-02 DIAGNOSIS — R97.20 ELEVATED PROSTATE SPECIFIC ANTIGEN (PSA): ICD-10-CM

## 2019-10-02 LAB
BILIRUB BLD-MCNC: NEGATIVE MG/DL
CLARITY, POC: CLEAR
COLOR UR: YELLOW
GLUCOSE UR STRIP-MCNC: NEGATIVE MG/DL
KETONES UR QL: NEGATIVE
LEUKOCYTE EST, POC: ABNORMAL
NITRITE UR-MCNC: NEGATIVE MG/ML
PH UR: 5.5 [PH] (ref 5–8)
PROT UR STRIP-MCNC: NEGATIVE MG/DL
RBC # UR STRIP: ABNORMAL /UL
SP GR UR: 1.02 (ref 1–1.03)
UROBILINOGEN UR QL: NORMAL

## 2019-10-02 PROCEDURE — 99204 OFFICE O/P NEW MOD 45 MIN: CPT | Performed by: UROLOGY

## 2019-10-02 PROCEDURE — 81001 URINALYSIS AUTO W/SCOPE: CPT | Performed by: UROLOGY

## 2019-10-02 RX ORDER — CEPHALEXIN 500 MG/1
500 CAPSULE ORAL 3 TIMES DAILY
Qty: 9 CAPSULE | Refills: 0 | Status: SHIPPED | OUTPATIENT
Start: 2019-10-02 | End: 2019-10-05

## 2019-10-08 ENCOUNTER — HOSPITAL ENCOUNTER (OUTPATIENT)
Facility: HOSPITAL | Age: 69
Setting detail: HOSPITAL OUTPATIENT SURGERY
Discharge: HOME | End: 2019-10-08
Attending: INTERNAL MEDICINE | Admitting: INTERNAL MEDICINE

## 2019-10-08 ENCOUNTER — ANESTHESIA EVENT (OUTPATIENT)
Dept: GASTROENTEROLOGY | Facility: HOSPITAL | Age: 69
End: 2019-10-08

## 2019-10-08 ENCOUNTER — ANESTHESIA (OUTPATIENT)
Dept: GASTROENTEROLOGY | Facility: HOSPITAL | Age: 69
End: 2019-10-08

## 2019-10-08 VITALS
BODY MASS INDEX: 26.37 KG/M2 | DIASTOLIC BLOOD PRESSURE: 72 MMHG | TEMPERATURE: 97.4 F | SYSTOLIC BLOOD PRESSURE: 111 MMHG | HEIGHT: 73 IN | HEART RATE: 69 BPM | RESPIRATION RATE: 16 BRPM | OXYGEN SATURATION: 98 % | WEIGHT: 199 LBS

## 2019-10-08 DIAGNOSIS — R10.31 RIGHT LOWER QUADRANT ABDOMINAL PAIN: ICD-10-CM

## 2019-10-08 PROCEDURE — 25010000002 PROPOFOL 10 MG/ML EMULSION: Performed by: NURSE ANESTHETIST, CERTIFIED REGISTERED

## 2019-10-08 PROCEDURE — 45385 COLONOSCOPY W/LESION REMOVAL: CPT | Performed by: INTERNAL MEDICINE

## 2019-10-08 PROCEDURE — 45380 COLONOSCOPY AND BIOPSY: CPT | Performed by: INTERNAL MEDICINE

## 2019-10-08 PROCEDURE — 88305 TISSUE EXAM BY PATHOLOGIST: CPT | Performed by: INTERNAL MEDICINE

## 2019-10-08 RX ORDER — SODIUM CHLORIDE 0.9 % (FLUSH) 0.9 %
10 SYRINGE (ML) INJECTION AS NEEDED
Status: DISCONTINUED | OUTPATIENT
Start: 2019-10-08 | End: 2019-10-08 | Stop reason: HOSPADM

## 2019-10-08 RX ORDER — PROPOFOL 10 MG/ML
VIAL (ML) INTRAVENOUS AS NEEDED
Status: DISCONTINUED | OUTPATIENT
Start: 2019-10-08 | End: 2019-10-08 | Stop reason: SURG

## 2019-10-08 RX ORDER — DOXAZOSIN MESYLATE 1 MG/1
1 TABLET ORAL NIGHTLY
COMMUNITY
End: 2021-03-05

## 2019-10-08 RX ORDER — SODIUM CHLORIDE 9 MG/ML
500 INJECTION, SOLUTION INTRAVENOUS CONTINUOUS PRN
Status: DISCONTINUED | OUTPATIENT
Start: 2019-10-08 | End: 2019-10-08 | Stop reason: HOSPADM

## 2019-10-08 RX ADMIN — LIDOCAINE HYDROCHLORIDE 100 MG: 20 INJECTION, SOLUTION INTRAVENOUS at 08:43

## 2019-10-08 RX ADMIN — SODIUM CHLORIDE 500 ML: 9 INJECTION, SOLUTION INTRAVENOUS at 08:02

## 2019-10-08 RX ADMIN — PROPOFOL 600 MG: 10 INJECTION, EMULSION INTRAVENOUS at 08:43

## 2019-10-08 NOTE — ANESTHESIA POSTPROCEDURE EVALUATION
"Patient: Korey Ruelas    Procedure Summary     Date:  10/08/19 Room / Location:  [facility] ENDOSCOPY 6 / [facility]    Anesthesia Start:  0834 Anesthesia Stop:  0907    Procedure:  COLONOSCOPY WITH ANESTHESIA (N/A ) Diagnosis:       Right lower quadrant abdominal pain      (Right lower quadrant abdominal pain [R10.31])    Surgeon:  Contreras Inman DO Provider:  Nikos Gomez CRNA    Anesthesia Type:  MAC ASA Status:  2          Anesthesia Type: MAC  Last vitals  BP   111/72 (10/08/19 0925)   Temp   97.4 °F (36.3 °C) (10/08/19 0744)   Pulse   69 (10/08/19 0925)   Resp   16 (10/08/19 0925)     SpO2   98 % (10/08/19 0925)     Post Anesthesia Care and Evaluation    Patient location during evaluation: PHASE II  Patient participation: complete - patient participated  Level of consciousness: awake and alert  Pain score: 0  Pain management: adequate  Airway patency: patent  Anesthetic complications: No anesthetic complications  PONV Status: none  Cardiovascular status: acceptable  Respiratory status: acceptable  Hydration status: acceptable    Comments: Blood pressure 111/72, pulse 69, temperature 97.4 °F (36.3 °C), temperature source Temporal, resp. rate 16, height 185.4 cm (73\"), weight 90.3 kg (199 lb), SpO2 98 %.    Pt discharged from PACU based on cory score >8      "

## 2019-10-08 NOTE — ANESTHESIA PREPROCEDURE EVALUATION
Anesthesia Evaluation     Patient summary reviewed and Nursing notes reviewed   NPO Solid Status: > 8 hours  NPO Liquid Status: > 2 hours           Airway   Mallampati: I  TM distance: >3 FB  Neck ROM: full  No difficulty expected  Dental      Pulmonary    Cardiovascular   Exercise tolerance: good (4-7 METS)    ECG reviewed    (+) hypertension,     ROS comment: MILTON 1/2019  · Estimated EF = 60%.  · Left ventricular systolic function is normal.  · Small patent foramen ovale present with right to left shunting.  · No vegetations, masses or thrombus       Neuro/Psych  GI/Hepatic/Renal/Endo    (+)   renal disease stones,     ROS Comment: Pt reports severe viral illness wintr 2018 that resulted in elevated LFTs, concern for endocarditis with normal MILTON, chest pain, cough, fever, chills. Now resolved. Still having ruq pain and nausea. Having colonoscopy to eval    Musculoskeletal     Abdominal    Substance History      OB/GYN          Other                        Anesthesia Plan    ASA 2     MAC     intravenous induction   Anesthetic plan, all risks, benefits, and alternatives have been provided, discussed and informed consent has been obtained with: patient.

## 2019-10-11 ENCOUNTER — TRANSCRIBE ORDERS (OUTPATIENT)
Dept: ADMINISTRATIVE | Facility: HOSPITAL | Age: 69
End: 2019-10-11

## 2019-10-11 DIAGNOSIS — M48.02 SPINAL STENOSIS OF CERVICAL REGION: Primary | ICD-10-CM

## 2019-10-16 ENCOUNTER — HOSPITAL ENCOUNTER (OUTPATIENT)
Dept: MRI IMAGING | Facility: HOSPITAL | Age: 69
Discharge: HOME | End: 2019-10-16
Admitting: FAMILY MEDICINE

## 2019-10-16 ENCOUNTER — PATIENT MESSAGE (OUTPATIENT)
Dept: UROLOGY | Facility: CLINIC | Age: 69
End: 2019-10-16

## 2019-10-16 PROCEDURE — 72141 MRI NECK SPINE W/O DYE: CPT

## 2019-10-16 PROCEDURE — 82565 ASSAY OF CREATININE: CPT

## 2019-10-17 ENCOUNTER — OFFICE VISIT (OUTPATIENT)
Dept: GASTROENTEROLOGY | Facility: CLINIC | Age: 69
End: 2019-10-17

## 2019-10-17 VITALS
OXYGEN SATURATION: 99 % | HEIGHT: 73 IN | DIASTOLIC BLOOD PRESSURE: 70 MMHG | WEIGHT: 199 LBS | TEMPERATURE: 97 F | HEART RATE: 62 BPM | SYSTOLIC BLOOD PRESSURE: 118 MMHG | BODY MASS INDEX: 26.37 KG/M2

## 2019-10-17 DIAGNOSIS — K63.9 ABNORMALITY OF COLON: ICD-10-CM

## 2019-10-17 DIAGNOSIS — R10.31 RIGHT LOWER QUADRANT ABDOMINAL PAIN: Primary | ICD-10-CM

## 2019-10-17 LAB — CREAT BLDA-MCNC: 1.9 MG/DL (ref 0.6–1.3)

## 2019-10-17 PROCEDURE — 99213 OFFICE O/P EST LOW 20 MIN: CPT | Performed by: INTERNAL MEDICINE

## 2019-10-17 NOTE — PROGRESS NOTES
Chief Complaint   Patient presents with   • Colonoscopy     10-8-19 had colon here for findings     Subjective   HPI     Korey Ruelas is a 69 y.o. male who underwent colonoscopy on 10/8/19 for further evaluation of RLQ pain.  Pt tolerated procedure well without any complications.   Endoscopically He  was found to have colon polyp and nodular area at 55 cm.  Histologically colon polyp was found to be Tubular Adenoma. Nodular area determined to be reactive change (discussed w Dr Hoff/Dr Ruelas).  He currently denies difficulty with abdominal pain, changes in bowels.  Five days ago developed RLQ pain w/ nausea, sx similar to previous complaints from early this year.   Followed by Dr Alcantar as well.  Hx of elevated GGT/elevated alk phos/RIVERA positive.    Past Medical History:   Diagnosis Date   • Chest tightness    • Hypertension    • Kidney stones    • Mono exposure     CURRENT INFECTION   • Shingles        Current Outpatient Medications:   •  acyclovir (ZOVIRAX) 400 MG tablet, Take 400 mg by mouth 2 (Two) Times a Day. Take no more than 5 doses a day., Disp: , Rfl:   •  aspirin 81 MG EC tablet, Take 81 mg by mouth As Needed., Disp: , Rfl:   •  cholecalciferol (VITAMIN D3) 1000 units tablet, Take 1,000 Units by mouth Daily., Disp: , Rfl:   •  dorzolamide (TRUSOPT) 2 % ophthalmic solution, 1 drop 3 (Three) Times a Day., Disp: , Rfl:   •  doxazosin (CARDURA) 1 MG tablet, Take 1 mg by mouth Every Night., Disp: , Rfl:   •  fluorometholone (FML) 0.1 % ophthalmic suspension, 1 drop Every 4 (Four) Hours., Disp: , Rfl:   •  lisinopril (PRINIVIL,ZESTRIL) 20 MG tablet, Take 20 mg by mouth Daily., Disp: , Rfl:   No Known Allergies  Social History     Socioeconomic History   • Marital status:      Spouse name: Not on file   • Number of children: Not on file   • Years of education: Not on file   • Highest education level: Not on file   Tobacco Use   • Smoking status: Never Smoker   • Smokeless tobacco: Never Used    Substance and Sexual Activity   • Alcohol use: No     Frequency: Never   • Drug use: No   • Sexual activity: Defer     Family History   Problem Relation Age of Onset   • Heart disease Mother    • Heart disease Father    • Cirrhosis Sister    • Colon polyps Neg Hx      Review of Systems   Constitutional: Negative for fatigue, fever and unexpected weight change.   HENT: Negative for hearing loss, sore throat and voice change.    Eyes: Negative for visual disturbance.   Respiratory: Negative for cough, shortness of breath and wheezing.    Cardiovascular: Negative for chest pain and palpitations.   Gastrointestinal: Negative for abdominal pain, blood in stool and vomiting.   Endocrine: Negative for polydipsia and polyuria.   Genitourinary: Negative for difficulty urinating, dysuria, hematuria and urgency.   Musculoskeletal: Negative for joint swelling and myalgias.   Skin: Negative for color change, rash and wound.   Neurological: Negative for dizziness, tremors, seizures and syncope.   Hematological: Does not bruise/bleed easily.   Psychiatric/Behavioral: Negative for agitation and confusion. The patient is not nervous/anxious.      Objective   Vitals:    10/17/19 1440   BP: 118/70   Pulse: 62   Temp: 97 °F (36.1 °C)   SpO2: 99%     Physical Exam   Constitutional: He is oriented to person, place, and time. He appears well-developed and well-nourished. He is cooperative.   HENT:   Head: Normocephalic and atraumatic.   Eyes: Conjunctivae are normal. Pupils are equal, round, and reactive to light. No scleral icterus.   Neck: Normal range of motion. Neck supple. No JVD present. No thyroid mass and no thyromegaly present.   Cardiovascular: Normal rate, regular rhythm and normal heart sounds. Exam reveals no gallop and no friction rub.   No murmur heard.  Pulmonary/Chest: Effort normal and breath sounds normal. No accessory muscle usage. No respiratory distress. He has no wheezes. He has no rales.   Abdominal: Soft. Normal  appearance and bowel sounds are normal. He exhibits no distension, no ascites and no mass. There is no hepatosplenomegaly. There is no tenderness. There is no rebound and no guarding.   Musculoskeletal: Normal range of motion. He exhibits no edema or tenderness.     Vascular Status -  His right foot exhibits normal foot vasculature  and no edema. His left foot exhibits normal foot vasculature  and no edema.  Lymphadenopathy:     He has no cervical adenopathy.   Neurological: He is alert and oriented to person, place, and time. He has normal strength. Gait normal.   Skin: Skin is warm, dry and intact. No rash noted.     Imaging Results (most recent)     None        Assessment/Plan   Korey was seen today for colonoscopy.    Diagnoses and all orders for this visit:    Right lower quadrant abdominal pain    Abnormality of colon      * Surgery not found *    Discussion about changes found in colon - recommend completing Dr Alcantar work up, pt will call office once therapy with Dr Alcantar completed  consider laparoscopic surgical evaluation vs repeat colonoscopy to reassess    Pathology report discussed with Dr tyler

## 2019-10-18 LAB
CYTO UR: NORMAL
LAB AP CASE REPORT: NORMAL
PATH REPORT.FINAL DX SPEC: NORMAL
PATH REPORT.GROSS SPEC: NORMAL

## 2019-10-23 ENCOUNTER — PROCEDURE VISIT (OUTPATIENT)
Dept: UROLOGY | Facility: CLINIC | Age: 69
End: 2019-10-23

## 2019-10-23 DIAGNOSIS — N13.8 BPH WITH URINARY OBSTRUCTION: ICD-10-CM

## 2019-10-23 DIAGNOSIS — N40.1 BPH WITH URINARY OBSTRUCTION: ICD-10-CM

## 2019-10-23 DIAGNOSIS — R31.0 GROSS HEMATURIA: ICD-10-CM

## 2019-10-23 DIAGNOSIS — R97.20 ELEVATED PROSTATE SPECIFIC ANTIGEN (PSA): Primary | ICD-10-CM

## 2019-10-23 LAB
BILIRUB BLD-MCNC: NEGATIVE MG/DL
CLARITY, POC: CLEAR
COLOR UR: YELLOW
GLUCOSE UR STRIP-MCNC: NEGATIVE MG/DL
KETONES UR QL: NEGATIVE
LEUKOCYTE EST, POC: NEGATIVE
NITRITE UR-MCNC: NEGATIVE MG/ML
PH UR: 7 [PH] (ref 5–8)
PROT UR STRIP-MCNC: NEGATIVE MG/DL
RBC # UR STRIP: NEGATIVE /UL
SP GR UR: 1.01 (ref 1–1.03)
UROBILINOGEN UR QL: NORMAL

## 2019-10-23 PROCEDURE — 81001 URINALYSIS AUTO W/SCOPE: CPT | Performed by: UROLOGY

## 2019-10-23 PROCEDURE — 52000 CYSTOURETHROSCOPY: CPT | Performed by: UROLOGY

## 2019-10-23 NOTE — PROGRESS NOTES
Pre- operative diagnosis:  Hematuria    Post operative diagnosis:  BPH    Procedure:  The patient was prepped and draped in a normal sterile fashion.  The urethra was anesthetized with 2% lidocaine jelly.  A flexible cystoscope was introduced per urethra.      Urethra:  Normal    Bladder:  mild trabeculation    Ureteral orifices:  Normal position bilaterally and Clear efflux bilaterally    Prostate:  lateral lobe hypertrophy    Patient tolerated the procedure well    Complications: none    Blood loss: minimal    Follow up:    [facility]

## 2019-10-28 ENCOUNTER — TELEPHONE (OUTPATIENT)
Dept: GASTROENTEROLOGY | Facility: CLINIC | Age: 69
End: 2019-10-28

## 2019-10-28 NOTE — TELEPHONE ENCOUNTER
patient called and left message that he had seen dr. Alcantar (urology) and wanted to talk to you.his number is [phone].  thanks

## 2019-10-29 NOTE — TELEPHONE ENCOUNTER
Spoke with patient last night on phone (apx 5pm)    He underwent procedure with dr ramirez 10/23 and anticipates prostate biopsy 11/11/19    He is wishing to undergo repeat colonoscopy after prostate biopsy      Adi, can you please let him know I spoke with korin in endo and we do not have capability to transfer images to disc.  A copy of pics [facility] to [facility].    Discussed with Dr Inman this morning, he plans on discussing patient with dr ramirez after biopsy     Thank you

## 2019-11-04 ENCOUNTER — HOSPITAL ENCOUNTER (OUTPATIENT)
Dept: CT IMAGING | Facility: HOSPITAL | Age: 69
Discharge: HOME | End: 2019-11-04
Admitting: UROLOGY

## 2019-11-04 DIAGNOSIS — R31.0 GROSS HEMATURIA: ICD-10-CM

## 2019-11-04 PROCEDURE — 74176 CT ABD & PELVIS W/O CONTRAST: CPT

## 2019-11-19 DIAGNOSIS — R97.20 ELEVATED PROSTATE SPECIFIC ANTIGEN (PSA): Primary | ICD-10-CM

## 2019-11-19 RX ORDER — CEPHALEXIN 500 MG/1
500 CAPSULE ORAL 3 TIMES DAILY
Qty: 9 CAPSULE | Refills: 0 | Status: SHIPPED | OUTPATIENT
Start: 2019-11-19 | End: 2019-11-22

## 2019-11-25 ENCOUNTER — PROCEDURE VISIT (OUTPATIENT)
Dept: UROLOGY | Facility: CLINIC | Age: 69
End: 2019-11-25

## 2019-11-25 DIAGNOSIS — R97.20 ELEVATED PROSTATE SPECIFIC ANTIGEN (PSA): Primary | ICD-10-CM

## 2019-11-25 LAB
BILIRUB BLD-MCNC: NEGATIVE MG/DL
CLARITY, POC: CLEAR
COLOR UR: YELLOW
GLUCOSE UR STRIP-MCNC: NEGATIVE MG/DL
KETONES UR QL: NEGATIVE
LEUKOCYTE EST, POC: NEGATIVE
NITRITE UR-MCNC: NEGATIVE MG/ML
PH UR: 5 [PH] (ref 5–8)
PROT UR STRIP-MCNC: NEGATIVE MG/DL
RBC # UR STRIP: NEGATIVE /UL
SP GR UR: 1.02 (ref 1–1.03)
UROBILINOGEN UR QL: NORMAL

## 2019-11-25 PROCEDURE — 55700 PR PROSTATE NEEDLE BIOPSY ANY APPROACH: CPT | Performed by: UROLOGY

## 2019-11-25 PROCEDURE — 96372 THER/PROPH/DIAG INJ SC/IM: CPT | Performed by: UROLOGY

## 2019-11-25 PROCEDURE — 81003 URINALYSIS AUTO W/O SCOPE: CPT | Performed by: UROLOGY

## 2019-11-25 PROCEDURE — G0416 PROSTATE BIOPSY, ANY MTHD: HCPCS | Performed by: UROLOGY

## 2019-11-25 PROCEDURE — 76942 ECHO GUIDE FOR BIOPSY: CPT | Performed by: UROLOGY

## 2019-11-25 PROCEDURE — 88342 IMHCHEM/IMCYTCHM 1ST ANTB: CPT | Performed by: UROLOGY

## 2019-11-25 RX ORDER — GENTAMICIN SULFATE 40 MG/ML
80 INJECTION, SOLUTION INTRAMUSCULAR; INTRAVENOUS ONCE
Status: COMPLETED | OUTPATIENT
Start: 2019-11-25 | End: 2019-11-25

## 2019-11-25 RX ADMIN — GENTAMICIN SULFATE 80 MG: 40 INJECTION, SOLUTION INTRAMUSCULAR; INTRAVENOUS at 08:56

## 2019-12-03 DIAGNOSIS — R97.20 ELEVATED PROSTATE SPECIFIC ANTIGEN (PSA): Primary | ICD-10-CM

## 2020-01-05 NOTE — PROGRESS NOTES
Primary Care Provider: Rodney Wise DO  Requesting Provider: Sharonda Teixeira APRN    Chief Complaint:   Chief Complaint   Patient presents with   • Neck Pain     Patient is here today for neck pain and bilateral numbness and tingling of both hands.     History of Present Illness  Consultation today at the request of MARYAM Ortiz    HISTORY/ HPI:  Korey Ruelas is a 69 y.o.  male who present today with his wife with a complaint of occasional neck discomfort, primarily bilateral upper extremity pain, numbness, tingling, and weakness; 5% neck, 95% upper extremities.   No previous spine surgeries.  No known injury.    Gradual and progressive onset of over the past 2 years that has worsened over the past 2-3 months.   Mr. Ruelas reports occasional neck discomfort after prolonged periods of hyperextension while riding his bicycle.  His primary complaint is bilateral upper extremity pain, numbness, tingling, and weakness; right > left.  He reports a constant dull ache with episodes of burning dysesthesias that radiates down the lateral aspect of the right arm extending to include the right hand with constant numbness to digits 1-2 and occasionally digit 3.  In regards to his left arm, he reports pain that radiates down the anterior aspect of the left deltoid and bicep, not extending past the elbow, and intermittent numbness and tingling to digits 1-2.  Additionally, he reports a worrisome trigger finger to digit 4 of the right hand that is triggered multiple times daily.  His upper extremity symptoms worsen with use.  No alleviating factors.  He denies fevers, chills, night sweats, unexplained weight loss, alterations in fine motor skills, gait or balance instabilities, saddle anesthesia, or bowel or bladder dysfunction.  He currently rates the severity of his symptoms 3/10.  No additional concerns at this time.    Korey has not completed nor participated in a dedicated course of physician  directed physical therapy, massage and or chiropractic care, nor been evaluated by pain management.    Oswestry Disability Index (Arsh et al, 1980)   Score   Pain Intensity 2   Personal Care 0   Lifting 0   Reading 0   Headaches 0   Concentration 0   Work 2   Driving 0   Sleeping 2   Recreation 1   TOTAL =  7     SCORE INTERPRETATION OF THE OSWESTRY NECK DISABILITY QUESTIONNAIRE  0-4: No disability  5-14: Mild disability   15-24: Moderate disability   25-34: Severe disability   >35: Complete disability    ROS:  Review of Systems   Constitutional: Negative for unexpected weight change.   HENT: Negative.    Eyes: Negative.    Respiratory: Negative.    Cardiovascular: Negative.    Gastrointestinal: Negative.    Endocrine: Negative.    Genitourinary: Negative.    Musculoskeletal: Positive for back pain and neck pain.   Skin: Negative.    Allergic/Immunologic: Negative.    Neurological: Positive for weakness and numbness.   Hematological: Negative.    Psychiatric/Behavioral: Negative.      Past Medical History:   Diagnosis Date   • Chest tightness    • Hypertension    • Kidney stones    • Mono exposure     CURRENT INFECTION   • Shingles      Past Surgical History:   Procedure Laterality Date   • CATARACT EXTRACTION     • COLONOSCOPY N/A 10/8/2019    Procedure: COLONOSCOPY WITH ANESTHESIA;  Surgeon: Contreras Inman DO;  Location: [facility]COPY;  Service: Gastroenterology   • KNEE SURGERY      1974     Family History: family history includes Cirrhosis in his sister; Heart disease in his father and mother.    Social History:  reports that he has never smoked. He has never used smokeless tobacco. He reports that he does not drink alcohol or use drugs.    Medications:    Current Outpatient Medications:   •  acyclovir (ZOVIRAX) 400 MG tablet, Take 400 mg by mouth 2 (Two) Times a Day. Take no more than 5 doses a day., Disp: , Rfl:   •  aspirin 81 MG EC tablet, Take 81 mg by mouth As Needed., Disp: , Rfl:   •   "cholecalciferol (VITAMIN D3) 1000 units tablet, Take 1,000 Units by mouth Daily., Disp: , Rfl:   •  dorzolamide (TRUSOPT) 2 % ophthalmic solution, 1 drop 3 (Three) Times a Day., Disp: , Rfl:   •  doxazosin (CARDURA) 1 MG tablet, Take 1 mg by mouth Every Night., Disp: , Rfl:   •  fluorometholone (FML) 0.1 % ophthalmic suspension, 1 drop Every 4 (Four) Hours., Disp: , Rfl:   •  lisinopril (PRINIVIL,ZESTRIL) 20 MG tablet, Take 20 mg by mouth Daily., Disp: , Rfl:     Allergies:  Patient has no known allergies.    OBJECTIVE:  Objective   Ht 185.4 cm (73\")   Wt 90.3 kg (199 lb)   BMI 26.25 kg/m²   Physical Exam   Constitutional: He is oriented to person, place, and time. He appears well-developed and well-nourished. He is cooperative.  Non-toxic appearance. He does not have a sickly appearance. He does not appear ill. No distress.   BMI 26.3   HENT:   Head: Normocephalic and atraumatic.   Right Ear: Hearing normal.   Left Ear: Hearing normal.   Mouth/Throat: Mucous membranes are normal.   Eyes: Pupils are equal, round, and reactive to light. Conjunctivae and EOM are normal.   Neck: Trachea normal and full passive range of motion without pain. Neck supple.   Cardiovascular: Normal rate and regular rhythm.   Pulmonary/Chest: Effort normal. No accessory muscle usage. No apnea, no tachypnea and no bradypnea. No respiratory distress.   Abdominal: Soft. Normal appearance.   Neurological: He is alert and oriented to person, place, and time. GCS eye subscore is 4. GCS verbal subscore is 5. GCS motor subscore is 6.   Reflex Scores:       Tricep reflexes are 2+ on the right side and 3+ on the left side.       Bicep reflexes are 2+ on the right side and 3+ on the left side.       Brachioradialis reflexes are 2+ on the right side and 3+ on the left side.       Patellar reflexes are 2+ on the right side and 2+ on the left side.       Achilles reflexes are 2+ on the right side and 2+ on the left side.  Skin: Skin is warm, dry and " intact.   Psychiatric: He has a normal mood and affect. His speech is normal and behavior is normal.   Nursing note and vitals reviewed.    Neurologic Exam     Mental Status   Oriented to person, place, and time.   Attention: normal. Concentration: normal.   Speech: speech is normal   Level of consciousness: alert    Cranial Nerves     CN II   Visual fields full to confrontation.     CN III, IV, VI   Pupils are equal, round, and reactive to light.  Extraocular motions are normal.     CN V   Facial sensation intact.     CN VII   Facial expression full, symmetric.     CN VIII   CN VIII normal.     CN IX, X   CN IX normal.     CN XI   CN XI normal.     Motor Exam   Muscle bulk: normal  Overall muscle tone: normal  Right arm tone: normal  Left arm tone: normal  Right arm pronator drift: absent  Left arm pronator drift: absent  Right leg tone: normal  Left leg tone: normal    Strength   Right deltoid: 5/5  Left deltoid: 5/5  Right biceps: 4/5  Left biceps: 5/5  Right triceps: 5/5  Left triceps: 5/5  Right wrist extension: 5/5  Left wrist extension: 5/5  Right iliopsoas: 5/5  Left iliopsoas: 5/5  Right quadriceps: 5/5  Left quadriceps: 5/5  Right anterior tibial: 5/5  Left anterior tibial: 5/5  Right posterior tibial: 5/5  Left posterior tibial: 5/5    Sensory Exam   Right arm light touch: decreased from wrist  Left arm light touch: decreased from wrist  Right leg light touch: normal  Left leg light touch: normal    Positive Tinel's sign over the bilateral cubital fossa and median nerve     Gait, Coordination, and Reflexes     Gait  Gait: (Antalgic)    Tremor   Resting tremor: absent  Intention tremor: absent  Action tremor: absent    Reflexes   Right brachioradialis: 2+  Left brachioradialis: 3+  Right biceps: 2+  Left biceps: 3+  Right triceps: 2+  Left triceps: 3+  Right patellar: 2+  Left patellar: 2+  Right achilles: 2+  Left achilles: 2+  Right plantar: normal  Left plantar: normal  Right Obrien: absent  Left  Obrien: absent  Right ankle clonus: absent  Left ankle clonus: absent  Right pendular knee jerk: absent  Left pendular knee jerk: absent    Male  strength (pounds)  AGE Right Hand RH Norms Left Hand LH Norms   20-24  121+20.6  104+21.8   25-29  120+23.0  110+16.2   30-34  121+22.4  110+21.7   35-39  119+24  113+21.7   40-44  117+20.7  112+18.7   45-49  110+23.0  101+22.8   50-54  113+18.1  102+17   55-59  101+26.7  83+23.4   60-64  90+20.4  77+20.3   65-69 57* 91+20.6 71 76.8+19.8   70-74  75+21.5  65+18.1   75+  66+21.0  55+17.0   (STEVE Moralez et al; Hand Dynometer: Effects of trials and sessions.  Perpetual and Motor Skills 61:195-8, 1985)    Imaging: (independent review and interpretation)  10/16/19 MRI of the cervical spine shows no acute fractures, bone marrow and STIR signal change within the C7 vertebral body, no T2 signal change within the central cord, retrolisthesis of C5 over C6, multilevel degenerative changes resulting in thecal sac and left foraminal narrowing at C6-7 and central canal and bilateral foraminal stenosis at C5-6.              ASSESSMENT/ PLAN:  Korey Ruelas is a 69 y.o. male hypertension, renal stones, shingles, mono exposure, and he is overweight.  He presents with a new problem of occasional neck discomfort, primarily bilateral upper extremity pain, numbness, tingling, and weakness; 5% neck, 95% upper extremities.  Physical exam findings of right  strength weakness, 4/5 right bicep weakness, positive Tinel's sign over the bilateral cubital fossae and median nerve, hyperreflexia to the left upper extremity, and an antalgic gait.  His imaging shows bone marrow and STIR signal change within the C7 vertebral body, no T2 signal change within the central cord, retrolisthesis of C5 over C6, multilevel degenerative changes resulting in thecal sac and left foraminal narrowing at C6-7 and central canal and bilateral foraminal stenosis at C5-6.  Imaging discussed and reviewed with patient  and family.    TREATMENT RECOMMENDATIONS ...  Cervicalgia  X-rays of the cervical spine complete with flexion and extension.  PT/OT, prescription provided to patient  Chiropractic and/or massage as tolerated  Unless contraindicated, Tylenol and ibuprofen or naproxen PRN per package instructions.  B/R/AE and use discussed.    Abnormal MRI cervical spine  MRI of the cervical spine show bone marrow and STIR signal changes within the C7 vertebral body concerning for an atypical hemangioma.  Per the radiologist recommendations, for further evaluation of the questionable atypical hemangioma, we will proceed today by obtaining a CT of the cervical spine.    Bilateral upper extremity pain/numbness/tingling  EMG/NCS of the bilateral upper extremities to confirm or refute radiculopathy from the cervical spine, narrowing of the ulnar nerve, or narrowing of the medial nerve.    Overweight  BMI today is 26.3.  Information on the DASH diet provided in the AVS.  We will continue to provided diet and exercise information with the goal of weight loss at each scheduled appointment.     Once all testing is complete we will have Mr. Ruelas follow-up with Dr. Pringle for reassessment and to discuss the need for surgical intervention.  I advised the patient to call and return sooner for new or worsening complaints of weakness, paresthesias, gait disturbances, or any additional concerns.  Treatment options discussed in detail with Korey and he voiced understanding.  Mr. Ruelas agrees with this plan of care.    Korey was seen today for neck pain.    Diagnoses and all orders for this visit:    Cervicalgia  -     XR spine cervical complete w flex ext; Future  -     Ambulatory Referral to Physical Therapy Evaluate and treat (3x a week for 6 weeks); Stretching, ROM, Strengthening    Abnormal MRI, cervical spine  -     CT Cervical Spine Without Contrast; Future    Bilateral arm pain  -     EMG & Nerve Conduction Test; Future    Numbness  and tingling in both hands  -     EMG & Nerve Conduction Test; Future    Overweight (BMI 25.0-29.9)      Return for Follow up with Dr Pringle next available.    Thank you for this Consultation and the opportunity to participate in Korey's care.    Sincerely,  MARYAM Llanos    Level of Risk: Moderate due to: undiagnosed new problem  MDM: Moderate Complexity  (Mod = 36583, High = 75507)

## 2020-01-07 ENCOUNTER — OFFICE VISIT (OUTPATIENT)
Dept: NEUROSURGERY | Facility: CLINIC | Age: 70
End: 2020-01-07

## 2020-01-07 ENCOUNTER — HOSPITAL ENCOUNTER (OUTPATIENT)
Dept: GENERAL RADIOLOGY | Facility: HOSPITAL | Age: 70
Discharge: HOME | End: 2020-01-07
Admitting: NURSE PRACTITIONER

## 2020-01-07 VITALS — HEIGHT: 73 IN | BODY MASS INDEX: 26.37 KG/M2 | WEIGHT: 199 LBS

## 2020-01-07 DIAGNOSIS — M79.602 BILATERAL ARM PAIN: ICD-10-CM

## 2020-01-07 DIAGNOSIS — E66.3 OVERWEIGHT (BMI 25.0-29.9): ICD-10-CM

## 2020-01-07 DIAGNOSIS — M54.2 CERVICALGIA: ICD-10-CM

## 2020-01-07 DIAGNOSIS — R20.2 NUMBNESS AND TINGLING IN BOTH HANDS: ICD-10-CM

## 2020-01-07 DIAGNOSIS — M79.601 BILATERAL ARM PAIN: ICD-10-CM

## 2020-01-07 DIAGNOSIS — M54.2 CERVICALGIA: Primary | ICD-10-CM

## 2020-01-07 DIAGNOSIS — R93.7 ABNORMAL MRI, CERVICAL SPINE: ICD-10-CM

## 2020-01-07 DIAGNOSIS — R20.0 NUMBNESS AND TINGLING IN BOTH HANDS: ICD-10-CM

## 2020-01-07 PROCEDURE — 72052 X-RAY EXAM NECK SPINE 6/>VWS: CPT

## 2020-01-07 PROCEDURE — 99204 OFFICE O/P NEW MOD 45 MIN: CPT | Performed by: NURSE PRACTITIONER

## 2020-01-07 NOTE — PATIENT INSTRUCTIONS
"DASH Eating Plan  DASH stands for \"Dietary Approaches to Stop Hypertension.\" The DASH eating plan is a healthy eating plan that has been shown to reduce high blood pressure (hypertension). It may also reduce your risk for type 2 diabetes, heart disease, and stroke. The DASH eating plan may also help with weight loss.  What are tips for following this plan?    General guidelines  · Avoid eating more than 2,300 mg (milligrams) of salt (sodium) a day. If you have hypertension, you may need to reduce your sodium intake to 1,500 mg a day.  · Limit alcohol intake to no more than 1 drink a day for nonpregnant women and 2 drinks a day for men. One drink equals 12 oz of beer, 5 oz of wine, or 1½ oz of hard liquor.  · Work with your health care provider to maintain a healthy body weight or to lose weight. Ask what an ideal weight is for you.  · Get at least 30 minutes of exercise that causes your heart to beat faster (aerobic exercise) most days of the week. Activities may include walking, swimming, or biking.  · Work with your health care provider or diet and nutrition specialist (dietitian) to adjust your eating plan to your individual calorie needs.  Reading food labels    · Check food labels for the amount of sodium per serving. Choose foods with less than 5 percent of the Daily Value of sodium. Generally, foods with less than 300 mg of sodium per serving fit into this eating plan.  · To find whole grains, look for the word \"whole\" as the first word in the ingredient list.  Shopping  · Buy products labeled as \"low-sodium\" or \"no salt added.\"  · Buy fresh foods. Avoid canned foods and premade or frozen meals.  Cooking  · Avoid adding salt when cooking. Use salt-free seasonings or herbs instead of table salt or sea salt. Check with your health care provider or pharmacist before using salt substitutes.  · Do not valle foods. Cook foods using healthy methods such as baking, boiling, grilling, and broiling instead.  · Cook with " heart-healthy oils, such as olive, canola, soybean, or sunflower oil.  Meal planning  · Eat a balanced diet that includes:  ? 5 or more servings of fruits and vegetables each day. At each meal, try to fill half of your plate with fruits and vegetables.  ? Up to 6-8 servings of whole grains each day.  ? Less than 6 oz of lean meat, poultry, or fish each day. A 3-oz serving of meat is about the same size as a deck of cards. One egg equals 1 oz.  ? 2 servings of low-fat dairy each day.  ? A serving of nuts, seeds, or beans 5 times each week.  ? Heart-healthy fats. Healthy fats called Omega-3 fatty acids are found in foods such as flaxseeds and coldwater fish, like sardines, salmon, and mackerel.  · Limit how much you eat of the following:  ? Canned or prepackaged foods.  ? Food that is high in trans fat, such as fried foods.  ? Food that is high in saturated fat, such as fatty meat.  ? Sweets, desserts, sugary drinks, and other foods with added sugar.  ? Full-fat dairy products.  · Do not salt foods before eating.  · Try to eat at least 2 vegetarian meals each week.  · Eat more home-cooked food and less restaurant, buffet, and fast food.  · When eating at a restaurant, ask that your food be prepared with less salt or no salt, if possible.  What foods are recommended?  The items listed may not be a complete list. Talk with your dietitian about what dietary choices are best for you.  Grains  Whole-grain or whole-wheat bread. Whole-grain or whole-wheat pasta. Brown rice. Oatmeal. Quinoa. Bulgur. Whole-grain and low-sodium cereals. Magui bread. Low-fat, low-sodium crackers. Whole-wheat flour tortillas.  Vegetables  Fresh or frozen vegetables (raw, steamed, roasted, or grilled). Low-sodium or reduced-sodium tomato and vegetable juice. Low-sodium or reduced-sodium tomato sauce and tomato paste. Low-sodium or reduced-sodium canned vegetables.  Fruits  All fresh, dried, or frozen fruit. Canned fruit in natural juice (without  added sugar).  Meat and other protein foods  Skinless chicken or turkey. Ground chicken or turkey. Pork with fat trimmed off. Fish and seafood. Egg whites. Dried beans, peas, or lentils. Unsalted nuts, nut butters, and seeds. Unsalted canned beans. Lean cuts of beef with fat trimmed off. Low-sodium, lean deli meat.  Dairy  Low-fat (1%) or fat-free (skim) milk. Fat-free, low-fat, or reduced-fat cheeses. Nonfat, low-sodium ricotta or cottage cheese. Low-fat or nonfat yogurt. Low-fat, low-sodium cheese.  Fats and oils  Soft margarine without trans fats. Vegetable oil. Low-fat, reduced-fat, or light mayonnaise and salad dressings (reduced-sodium). Canola, safflower, olive, soybean, and sunflower oils. Avocado.  Seasoning and other foods  Herbs. Spices. Seasoning mixes without salt. Unsalted popcorn and pretzels. Fat-free sweets.  What foods are not recommended?  The items listed may not be a complete list. Talk with your dietitian about what dietary choices are best for you.  Grains  Baked goods made with fat, such as croissants, muffins, or some breads. Dry pasta or rice meal packs.  Vegetables  Creamed or fried vegetables. Vegetables in a cheese sauce. Regular canned vegetables (not low-sodium or reduced-sodium). Regular canned tomato sauce and paste (not low-sodium or reduced-sodium). Regular tomato and vegetable juice (not low-sodium or reduced-sodium). Pickles. Olives.  Fruits  Canned fruit in a light or heavy syrup. Fried fruit. Fruit in cream or butter sauce.  Meat and other protein foods  Fatty cuts of meat. Ribs. Fried meat. Salmon. Sausage. Bologna and other processed lunch meats. Salami. Fatback. Hotdogs. Bratwurst. Salted nuts and seeds. Canned beans with added salt. Canned or smoked fish. Whole eggs or egg yolks. Chicken or turkey with skin.  Dairy  Whole or 2% milk, cream, and half-and-half. Whole or full-fat cream cheese. Whole-fat or sweetened yogurt. Full-fat cheese. Nondairy creamers. Whipped toppings.  Processed cheese and cheese spreads.  Fats and oils  Butter. Stick margarine. Lard. Shortening. Ghee. Salmon fat. Tropical oils, such as coconut, palm kernel, or palm oil.  Seasoning and other foods  Salted popcorn and pretzels. Onion salt, garlic salt, seasoned salt, table salt, and sea salt. Worcestershire sauce. Tartar sauce. Barbecue sauce. Teriyaki sauce. Soy sauce, including reduced-sodium. Steak sauce. Canned and packaged gravies. Fish sauce. Oyster sauce. Cocktail sauce. Horseradish that you find on the shelf. Ketchup. Mustard. Meat flavorings and tenderizers. Bouillon cubes. Hot sauce and Tabasco sauce. Premade or packaged marinades. Premade or packaged taco seasonings. Relishes. Regular salad dressings.  Where to find more information:  · National Heart, Lung, and Blood [place]: www.nhlbi.nih.gov  · American Heart Association: www.heart.org  Summary  · The DASH eating plan is a healthy eating plan that has been shown to reduce high blood pressure (hypertension). It may also reduce your risk for type 2 diabetes, heart disease, and stroke.  · With the DASH eating plan, you should limit salt (sodium) intake to 2,300 mg a day. If you have hypertension, you may need to reduce your sodium intake to 1,500 mg a day.  · When on the DASH eating plan, aim to eat more fresh fruits and vegetables, whole grains, lean proteins, low-fat dairy, and heart-healthy fats.  · Work with your health care provider or diet and nutrition specialist (dietitian) to adjust your eating plan to your individual calorie needs.  This information is not intended to replace advice given to you by your health care provider. Make sure you discuss any questions you have with your health care provider.  Document Released: 12/06/2012 Document Revised: 12/11/2017 Document Reviewed: 12/11/2017  [employer] Interactive Patient Education © 2019 [employer]

## 2020-01-21 ENCOUNTER — HOSPITAL ENCOUNTER (OUTPATIENT)
Dept: CT IMAGING | Facility: HOSPITAL | Age: 70
Discharge: HOME | End: 2020-01-21

## 2020-01-21 ENCOUNTER — HOSPITAL ENCOUNTER (OUTPATIENT)
Dept: NEUROLOGY | Facility: HOSPITAL | Age: 70
Discharge: HOME | End: 2020-01-21
Admitting: NURSE PRACTITIONER

## 2020-01-21 DIAGNOSIS — R20.2 NUMBNESS AND TINGLING IN BOTH HANDS: ICD-10-CM

## 2020-01-21 DIAGNOSIS — M79.602 BILATERAL ARM PAIN: ICD-10-CM

## 2020-01-21 DIAGNOSIS — M79.601 BILATERAL ARM PAIN: ICD-10-CM

## 2020-01-21 DIAGNOSIS — R93.7 ABNORMAL MRI, CERVICAL SPINE: ICD-10-CM

## 2020-01-21 DIAGNOSIS — R20.0 NUMBNESS AND TINGLING IN BOTH HANDS: ICD-10-CM

## 2020-01-21 PROCEDURE — 95886 MUSC TEST DONE W/N TEST COMP: CPT

## 2020-01-21 PROCEDURE — 72125 CT NECK SPINE W/O DYE: CPT

## 2020-01-21 PROCEDURE — 95911 NRV CNDJ TEST 9-10 STUDIES: CPT

## 2020-03-10 ENCOUNTER — TELEPHONE (OUTPATIENT)
Dept: NEUROSURGERY | Facility: CLINIC | Age: 70
End: 2020-03-10

## 2020-03-10 NOTE — TELEPHONE ENCOUNTER
Patient contacted office requesting sooner appointment for test results, and worsening arm/shoulder pain. Reviewed results with Dr. Pringle, he would like to see patient sooner than appointment in April for cervical surgical discussion with patient. Patient advised, aware of new appointment and voiced understanding.

## 2020-03-16 ENCOUNTER — TELEPHONE (OUTPATIENT)
Dept: NEUROSURGERY | Facility: CLINIC | Age: 70
End: 2020-03-16

## 2020-03-16 NOTE — TELEPHONE ENCOUNTER
Spoke with patient, appointment for 03/20/2020 canceled due COVID19. Patient voiced understanding, will be contacting to reschedule as soon as possible.

## 2020-05-08 ENCOUNTER — OFFICE VISIT (OUTPATIENT)
Dept: NEUROSURGERY | Facility: CLINIC | Age: 70
End: 2020-05-08

## 2020-05-08 VITALS — BODY MASS INDEX: 27.57 KG/M2 | WEIGHT: 208 LBS | HEIGHT: 73 IN

## 2020-05-08 DIAGNOSIS — M89.9 BONE LESION: ICD-10-CM

## 2020-05-08 DIAGNOSIS — E66.3 OVERWEIGHT (BMI 25.0-29.9): ICD-10-CM

## 2020-05-08 DIAGNOSIS — G56.03 BILATERAL CARPAL TUNNEL SYNDROME: Primary | ICD-10-CM

## 2020-05-08 DIAGNOSIS — G56.21 CUBITAL TUNNEL SYNDROME ON RIGHT: ICD-10-CM

## 2020-05-08 DIAGNOSIS — Z78.9 NON-TOBACCO USER: ICD-10-CM

## 2020-05-08 DIAGNOSIS — M48.02 CERVICAL SPINAL STENOSIS: ICD-10-CM

## 2020-05-08 PROCEDURE — 99215 OFFICE O/P EST HI 40 MIN: CPT | Performed by: NEUROLOGICAL SURGERY

## 2020-05-08 NOTE — PATIENT INSTRUCTIONS
"PATIENT TO CONTINUE TO FOLLOW UP WITH HIS/HER PRIMARY CARE PROVIDER FOR YEARLY PHYSICAL EXAMS TO ENSURE COMPLETE HEALTH MAINTENANCE      DASH Eating Plan  DASH stands for \"Dietary Approaches to Stop Hypertension.\" The DASH eating plan is a healthy eating plan that has been shown to reduce high blood pressure (hypertension). It may also reduce your risk for type 2 diabetes, heart disease, and stroke. The DASH eating plan may also help with weight loss.  What are tips for following this plan?    General guidelines  · Avoid eating more than 2,300 mg (milligrams) of salt (sodium) a day. If you have hypertension, you may need to reduce your sodium intake to 1,500 mg a day.  · Limit alcohol intake to no more than 1 drink a day for nonpregnant women and 2 drinks a day for men. One drink equals 12 oz of beer, 5 oz of wine, or 1½ oz of hard liquor.  · Work with your health care provider to maintain a healthy body weight or to lose weight. Ask what an ideal weight is for you.  · Get at least 30 minutes of exercise that causes your heart to beat faster (aerobic exercise) most days of the week. Activities may include walking, swimming, or biking.  · Work with your health care provider or diet and nutrition specialist (dietitian) to adjust your eating plan to your individual calorie needs.  Reading food labels    · Check food labels for the amount of sodium per serving. Choose foods with less than 5 percent of the Daily Value of sodium. Generally, foods with less than 300 mg of sodium per serving fit into this eating plan.  · To find whole grains, look for the word \"whole\" as the first word in the ingredient list.  Shopping  · Buy products labeled as \"low-sodium\" or \"no salt added.\"  · Buy fresh foods. Avoid canned foods and premade or frozen meals.  Cooking  · Avoid adding salt when cooking. Use salt-free seasonings or herbs instead of table salt or sea salt. Check with your health care provider or pharmacist before using salt " substitutes.  · Do not valle foods. Cook foods using healthy methods such as baking, boiling, grilling, and broiling instead.  · Cook with heart-healthy oils, such as olive, canola, soybean, or sunflower oil.  Meal planning  · Eat a balanced diet that includes:  ? 5 or more servings of fruits and vegetables each day. At each meal, try to fill half of your plate with fruits and vegetables.  ? Up to 6-8 servings of whole grains each day.  ? Less than 6 oz of lean meat, poultry, or fish each day. A 3-oz serving of meat is about the same size as a deck of cards. One egg equals 1 oz.  ? 2 servings of low-fat dairy each day.  ? A serving of nuts, seeds, or beans 5 times each week.  ? Heart-healthy fats. Healthy fats called Omega-3 fatty acids are found in foods such as flaxseeds and coldwater fish, like sardines, salmon, and mackerel.  · Limit how much you eat of the following:  ? Canned or prepackaged foods.  ? Food that is high in trans fat, such as fried foods.  ? Food that is high in saturated fat, such as fatty meat.  ? Sweets, desserts, sugary drinks, and other foods with added sugar.  ? Full-fat dairy products.  · Do not salt foods before eating.  · Try to eat at least 2 vegetarian meals each week.  · Eat more home-cooked food and less restaurant, buffet, and fast food.  · When eating at a restaurant, ask that your food be prepared with less salt or no salt, if possible.  What foods are recommended?  The items listed may not be a complete list. Talk with your dietitian about what dietary choices are best for you.  Grains  Whole-grain or whole-wheat bread. Whole-grain or whole-wheat pasta. Brown rice. Oatmeal. Quinoa. Bulgur. Whole-grain and low-sodium cereals. Magui bread. Low-fat, low-sodium crackers. Whole-wheat flour tortillas.  Vegetables  Fresh or frozen vegetables (raw, steamed, roasted, or grilled). Low-sodium or reduced-sodium tomato and vegetable juice. Low-sodium or reduced-sodium tomato sauce and tomato  paste. Low-sodium or reduced-sodium canned vegetables.  Fruits  All fresh, dried, or frozen fruit. Canned fruit in natural juice (without added sugar).  Meat and other protein foods  Skinless chicken or turkey. Ground chicken or turkey. Pork with fat trimmed off. Fish and seafood. Egg whites. Dried beans, peas, or lentils. Unsalted nuts, nut butters, and seeds. Unsalted canned beans. Lean cuts of beef with fat trimmed off. Low-sodium, lean deli meat.  Dairy  Low-fat (1%) or fat-free (skim) milk. Fat-free, low-fat, or reduced-fat cheeses. Nonfat, low-sodium ricotta or cottage cheese. Low-fat or nonfat yogurt. Low-fat, low-sodium cheese.  Fats and oils  Soft margarine without trans fats. Vegetable oil. Low-fat, reduced-fat, or light mayonnaise and salad dressings (reduced-sodium). Canola, safflower, olive, soybean, and sunflower oils. Avocado.  Seasoning and other foods  Herbs. Spices. Seasoning mixes without salt. Unsalted popcorn and pretzels. Fat-free sweets.  What foods are not recommended?  The items listed may not be a complete list. Talk with your dietitian about what dietary choices are best for you.  Grains  Baked goods made with fat, such as croissants, muffins, or some breads. Dry pasta or rice meal packs.  Vegetables  Creamed or fried vegetables. Vegetables in a cheese sauce. Regular canned vegetables (not low-sodium or reduced-sodium). Regular canned tomato sauce and paste (not low-sodium or reduced-sodium). Regular tomato and vegetable juice (not low-sodium or reduced-sodium). Pickles. Olives.  Fruits  Canned fruit in a light or heavy syrup. Fried fruit. Fruit in cream or butter sauce.  Meat and other protein foods  Fatty cuts of meat. Ribs. Fried meat. Salmon. Sausage. Bologna and other processed lunch meats. Salami. Fatback. Hotdogs. Bratwurst. Salted nuts and seeds. Canned beans with added salt. Canned or smoked fish. Whole eggs or egg yolks. Chicken or turkey with skin.  Dairy  Whole or 2% milk,  cream, and half-and-half. Whole or full-fat cream cheese. Whole-fat or sweetened yogurt. Full-fat cheese. Nondairy creamers. Whipped toppings. Processed cheese and cheese spreads.  Fats and oils  Butter. Stick margarine. Lard. Shortening. Ghee. Salmon fat. Tropical oils, such as coconut, palm kernel, or palm oil.  Seasoning and other foods  Salted popcorn and pretzels. Onion salt, garlic salt, seasoned salt, table salt, and sea salt. Worcestershire sauce. Tartar sauce. Barbecue sauce. Teriyaki sauce. Soy sauce, including reduced-sodium. Steak sauce. Canned and packaged gravies. Fish sauce. Oyster sauce. Cocktail sauce. Horseradish that you find on the shelf. Ketchup. Mustard. Meat flavorings and tenderizers. Bouillon cubes. Hot sauce and Tabasco sauce. Premade or packaged marinades. Premade or packaged taco seasonings. Relishes. Regular salad dressings.  Where to find more information:  · National Heart, Lung, and Blood [place]: www.nhlbi.nih.gov  · American Heart Association: www.heart.org  Summary  · The DASH eating plan is a healthy eating plan that has been shown to reduce high blood pressure (hypertension). It may also reduce your risk for type 2 diabetes, heart disease, and stroke.  · With the DASH eating plan, you should limit salt (sodium) intake to 2,300 mg a day. If you have hypertension, you may need to reduce your sodium intake to 1,500 mg a day.  · When on the DASH eating plan, aim to eat more fresh fruits and vegetables, whole grains, lean proteins, low-fat dairy, and heart-healthy fats.  · Work with your health care provider or diet and nutrition specialist (dietitian) to adjust your eating plan to your individual calorie needs.  This information is not intended to replace advice given to you by your health care provider. Make sure you discuss any questions you have with your health care provider.  Document Released: 12/06/2012 Document Revised: 12/11/2017 Document Reviewed: 12/11/2017  [employer]active Patient Education © 2020 [employer]

## 2020-05-08 NOTE — PROGRESS NOTES
Chief complaint:   Chief Complaint   Patient presents with   • Follow-up     Here to discuss surgical options. Complains of neck, B arm pain with numbness B hands. Has failed therapy. Known diagnosis of carpal tunnel, has tried/failed splinting. Describes leg weakness upon standing after sitting for long periods.        Subjective     HPI:   Interval History: Korey returns today to discuss cervical surgery and carpal tunnel release.    Korey symptom originally began 3 years ago    Currently Korey complains of bilateral arm pain and relatively minor neck pain that has been present for 3 years  Pain: Korey pain is a 4/10 in severity and his percentage of distribution is predominantly biceps and triceps and radiating into the first through third fingers on each hand.  Numbness: All 5 fingers on the right hand and predominantly first through third fingers on the left hand  Fine Motor Skills: Increasing difficulty with buttons.  Picking up small objects is becoming more difficult.  He finds it when he exercises he has difficulty holding onto the bar.  Gait: No changes.  He does not use a cane or walker.  Bowel and Bladder Function: None    Alternative therapies: Completed a 6-week trial of physical therapy and occupational therapy to focus on his neck without any improvement.  Additionally he is completed bilateral wrist splints for 6 weeks without any improvement.    In the interim he is also received an EMG nerve conduction study he is here today for review of results.    Oswestry Disability Index, Cervical = 12%   Score   Pain Intensity Very mild pain - 1   Personal Care Look after myself normally without causing extra pain-0   Lifting Lift heavy weights but gives extra pain-1   Reading Read without pain-0   Headaches Slight infrequent headaches-1   Concentration Concentrate fully slight difficulty-1   Work I can only do my usual work only-1   Driving I can drive-0   Sleeping Less than 1 hour sleepiness-1    Recreation All recreational activities-0     SCORE INTERPRETATION OF THE OSWESTRY NECK DISABILITY QUESTIONNAIRE  0-8: No disability  10-28: Mild disability   30-48: Moderate disability   50-68: Severe disability   >70: Complete disability  (Moody et al, 1980)      Modified [place] [employer] (mJOA) score  CATEGORY SCORE   Upper extremity Motor Subscore Mild difficulty buttoning shirt-4   Lower Extremity Subscore Mild imbalance when standing or walking-6   Upper Extremity Sensory Score Mild loss of hand sensation-2   Urinary Function Subscore Normal urination-3   TOTAL = 15/18    The mJOA is an 18 point score of functional disability specific to cervical myelopathy.   15-18: Mild Myelopathy  12-14: Moderate Myelopathy  <12: Severe Myelopathy  Emiliano et al. (1991). Alexey et al.       Review of Systems   HENT: Negative.    Eyes: Negative.    Respiratory: Negative.    Cardiovascular: Negative.    Gastrointestinal: Negative.    Endocrine: Negative.    Genitourinary: Negative.    Musculoskeletal: Negative.    Skin: Negative.    Allergic/Immunologic: Negative.    Neurological: Positive for weakness and numbness.   Hematological: Negative.    Psychiatric/Behavioral: Negative.        PFSH:  Past Medical History:   Diagnosis Date   • Hypertension    • Kidney stones    • Shingles        Past Surgical History:   Procedure Laterality Date   • CATARACT EXTRACTION     • COLONOSCOPY N/A 10/8/2019    Procedure: COLONOSCOPY WITH ANESTHESIA;  Surgeon: Contreras Inman DO;  Location: [facility] ENDOSCOPY;  Service: Gastroenterology   • KNEE SURGERY      1974       Objective      Current Outpatient Medications   Medication Sig Dispense Refill   • acyclovir (ZOVIRAX) 400 MG tablet Take 400 mg by mouth 2 (Two) Times a Day. Take no more than 5 doses a day.     • aspirin 81 MG EC tablet Take 81 mg by mouth As Needed.     • cholecalciferol (VITAMIN D3) 1000 units tablet Take 1,000 Units by mouth Daily.     • dorzolamide  "(TRUSOPT) 2 % ophthalmic solution 1 drop 3 (Three) Times a Day.     • doxazosin (CARDURA) 1 MG tablet Take 1 mg by mouth Every Night.     • fluorometholone (FML) 0.1 % ophthalmic suspension 1 drop Every 4 (Four) Hours.       No current facility-administered medications for this visit.        Vital Signs  Ht 185.4 cm (73\")   Wt 94.3 kg (208 lb)   BMI 27.44 kg/m²   Physical Exam   Constitutional: He is oriented to person, place, and time.   Eyes: Pupils are equal, round, and reactive to light. EOM are normal.   Neurological: He is oriented to person, place, and time. Gait normal.   Reflex Scores:       Tricep reflexes are 2+ on the right side and 3+ on the left side.       Bicep reflexes are 2+ on the right side and 3+ on the left side.       Brachioradialis reflexes are 2+ on the right side and 3+ on the left side.       Patellar reflexes are 3+ on the right side and 3+ on the left side.       Achilles reflexes are 1+ on the right side and 1+ on the left side.  Psychiatric: His speech is normal.     Neurologic Exam     Mental Status   Oriented to person, place, and time.   Speech: speech is normal     Cranial Nerves     CN II   Visual fields full to confrontation.     CN III, IV, VI   Pupils are equal, round, and reactive to light.  Extraocular motions are normal.     CN V   Right facial sensation deficit: none  Left facial sensation deficit: none    CN VII   Facial expression full, symmetric.     CN VIII   Hearing: intact    CN IX, X   Palate: symmetric    CN XI   Right sternocleidomastoid strength: normal  Left sternocleidomastoid strength: normal    CN XII   Tongue deviation: none    Motor Exam     Strength   Right deltoid: 5/5  Left deltoid: 5/5  Right biceps: 5/5  Left biceps: 5/5  Right triceps: 5/5  Left triceps: 5/5  Right interossei: 4/5  Left interossei: 5/5  Right iliopsoas: 5/5  Left iliopsoas: 5/5  Right quadriceps: 5/5  Left quadriceps: 5/5  Right anterior tibial: 5/5  Left anterior tibial: 5/5  Right " gastroc: 5/5  Left gastroc: 5/5    Sensory Exam   Right arm light touch: normal  Left arm light touch: normal  Right leg light touch: normal  Left leg light touch: normal    Gait, Coordination, and Reflexes     Gait  Gait: normal    Reflexes   Right brachioradialis: 2+  Left brachioradialis: 3+  Right biceps: 2+  Left biceps: 3+  Right triceps: 2+  Left triceps: 3+  Right patellar: 3+  Left patellar: 3+  Right achilles: 1+  Left achilles: 1+  Right Obrien: absent  Left Obrien: absent      Male  strength (pounds)  AGE Right Hand RH Norms Left Hand LH Norms   20-24   121+20.6   104+21.8   25-29   120+23.0   110+16.2   30-34   121+22.4   110+21.7   35-39   119+24   113+21.7   40-44   117+20.7   112+18.7   45-49   110+23.0   101+22.8   50-54   113+18.1   102+17   55-59   101+26.7   83+23.4   60-64   90+20.4   77+20.3   65-69 57*,85 91+20.6 71,80 76.8+19.8   70-74   75+21.5   65+18.1   75+   66+21.0   55+17.0   (STEVE Moralez et al; Hand Dynometer: Effects of trials and sessions.  Perpetual and Motor Skills 61:195-8, 1985)     Imaging: (independent review and interpretation)  10/16/19 MRI of the cervical spine shows no acute fractures, bone marrow and STIR signal change within the C7 vertebral body, no T2 signal change within the central cord, retrolisthesis of C5 over C6, multilevel degenerative changes resulting in thecal sac and left foraminal narrowing at C6-7 and central canal and bilateral foraminal stenosis at C5-6.                         ASSESSMENT/ PLAN:  Korey Ruelas is a 69 y.o. male hypertension, renal stones, shingles, mono exposure, and he is overweight.  He presents with a new problem of occasional neck discomfort, primarily bilateral upper extremity pain, numbness, tingling, and weakness; 5% neck, 95% upper extremities.  Physical exam findings of right  strength weakness, 4/5 right bicep weakness, positive Tinel's sign over the bilateral cubital fossae and median nerve, hyperreflexia to the left  upper extremity, and an antalgic gait.  His imaging shows bone marrow and STIR signal change within the C7 vertebral body, no T2 signal change within the central cord, retrolisthesis of C5 over C6, multilevel degenerative changes resulting in thecal sac and left foraminal narrowing at C6-7 and central canal and bilateral foraminal stenosis at C5-6.  Imaging discussed and reviewed with patient and family.     TREATMENT RECOMMENDATIONS ...  Cervical Spondylitic Myelopathy  Cervical stenosis at C5/6 and C6/7  DDX: Cervical stenosis, facet arthropathy, brachial plexopathy, peripheral neuropathy    Natural history of cervical spondylitic myelopathy  1. In younger patients with mild CSM (age younger than 75 years and mJOA scale score > 12), both operative and nonoperative management options be offered, as objectively measurable deterioration in function is rarely seen acutely (quality of evidence: Class I; strength of recommendation, B).   2. Also the clinical gains after nonoperative treatment are often maintained over 3 years in 70% of cases (quality of evidence, Class III; strength of recommendation, D).   3. The course of CSM is mixed, with many patients experiencing a slow, stepwise decline. We addressed the fact that long periods of quiescence are not uncommon and that a subgroup of patients may have interim improvement (quality of evidence, Class III; strength of recommendation, D).   4. However, long periods of severe stenosis over many years are associated with demyelination of white matter and may result in necrosis of both gray and white matter leading to potentially irreversible deficit (quality of evidence Class III; strength of recommendation, D).     Expected Functional Outcome After Surgery  1. Korey is 69 y.o. and his symptoms have been present for 3 years.  Korey's preoperative  [place] [employer] Questionnaire (mJOA) is 15 and Oswestry Neck Disability Index (NDI) is 12.  These  validated functional outcome measure will be trended postoperatively to better quantify his recovery (quality of evidence, Class II; strength of recommendation, B).   2. Recovery varies  a. The mean mJOA scores improve from 10.5 to 14.1  (Marie et al., 1993)  b. 59% of patients recovering significant neuro function.  (Marie et al., 1993) (Alexis et al., 2002)  3. Duration of symptoms affects recovery rate  a. Symptom duration correlates with neurological recovery rate. (Marie et al., 1993)  b. 73% of patients symptoms for <2 yrs improved postop outcomes, whereas 53% with symptoms >2 yrs had improvement. (Raymonas et al., 2005)  c. In elderly patients, duration of symptoms (<1 yr) was predictive of an excellent neurological recovery. (Hallie et al., 2003)  d. Among elderly patients both symptom duration and severity of canal stenosis affected the neurological outcome. (Alexis et al., 2002)  4. Age affects recovery rate  a. 70% of patients <60 yrs old had an improved outcome score, whereas 56% of patients >60 yrs had an improved outcome score. (Chagas et al., 2005)  b. Some studies show limited effect of age ... (Yamazaki et al., 2003)  (Marie et al., 1993)  5. Symptom severity affects recovery  a. Among younger patients only symptom severity affected recovery. (Alexis et al., 2002)  6. The prognostic value of clinical factors such as age, duration of symptoms, and preoperative neurological function results were discussed with Korey (quality of evidence, Class III; strength of recommendation, D)    Radiographs  1. Preoperative T2 hyperintensity at multiple levels in the cervical cord predicts poor surgical outcome (quality of evidence, Class III; strength of recommendation, D).   2. Preoperative T1 hypointensity combined with T2 hyperintensity at the any single level predicts a poor surgical outcome (quality of evidence, Class III; strength of recommendation, D).   3. Spinal cord atrophy (transverse area < 45 mm2) may  predict worse outcome (quality of evidence, Class III; strength of recommendation, D).     Surgical decision Making  1. In patients with cervical stenosis without myelopathy who have abnormal EMG findings, decompression should be considered because the presence of EMG abnormalities or clinical radiculopathy is associated with development of symptomatic CSM (quality of evidence, Class I; strength of recommendation, B).  2. MILD CSM (mJOA scale score >12) be treated in the short term (3 years) either with surgical decompression or with nonoperative therapy (prolonged immobilization in a stiff cervical collar, “low-risk” activity modification or bed rest, and antiinflammatory medications) based on patient preference (quality of evidence, Class II; strength of recommendation, C).     Korey has elected to proceed with conservative management and would like to address his carpal tunnel syndrome first.  Therefore we will defer surgical decision on his neck until after carpal tunnel surgery.    Bilateral Carpal Tunnel Syndrome, Right>Left  Differential diagnosis: Carpal tunnel syndrome, diabetic neuropathy, thoracic outlet syndrome, complex regional pain syndrome, cervical stenosis with radiculopathy.     At this time I favor carpal tunnel syndrome as a cause for the patient's numbness. He has EMG and nerve conduction studies that confirm entrapment at the bilateral wrist.    Right more severe than left.    Risks of the procedure, including but not limited to infection (3%), pain, bleeding at the operative site, damage to local structures such as tendons, persistent pain, or generalized complications of anesthesia including stroke, coma, MI, or death.    Korey has completed a rigorous course of conservative therapy which includes rest and bracing.  We discussed risk, benefits, and complications of surgery and at this point the patient would like proceed with right carpal tunnel release.    Right Cubital Tunnel Syndrome,  Mild   Differential diagnosis:  Cubital tunnel syndrome, C8 or T1 radiculopathy, diabetic neuropathy, thoracic outlet syndrome, complex regional pain syndrome.     At this time I favor cubital tunnel syndrome as a cause for the patient's numbness and the objective findings of fourth and fifth digit numbness on the right hand. He has EMG and nerve conduction studies that confirm entrapment at the right elbow.      Risks of the procedure, including but not limited to infection (3%), pain, bleeding at the operative site, damage to local structures such as tendons, persistent pain, or generalized complications of anesthesia including stroke, coma, MI, or death.    Korey has completed a rigorous course of conservative therapy which includes rest and bracing.  We discussed risk, benefits, and complications of surgery and at this point the patient would like proceed with right cubital tunnel release.       Abnormal lesion in the C6 vertebral body   Known 11 mm lung nodule   Korey has an atypical mildly lytic lesion in the C7 vertebral body.  This does have a mildly sclerotic border.  It appears to respect anatomical landmarks.  A CT scan of the cervical spine was obtained for better characterization and did not show the typical pattern for hemangioma.  Given his history of having a lung nodule metastatic disease moves up in the differential.  Therefore I like to obtain a nuclear medicine study to evaluate the metabolic activity.  This can be done concomitantly with his carpal tunnel surgery.      Obesity counseling  BMI today is 26.3.  Information on the DASH diet provided in the AVS.  We will continue to provided diet and exercise information with the goal of weight loss at each scheduled appointment.          1. Bilateral carpal tunnel syndrome    2. Cervical spinal stenosis    3. Cubital tunnel syndrome on right    4. Bone lesion    5. Overweight (BMI 25.0-29.9)    6. Non-tobacco user         Recommendations:  Korey was seen today for follow-up.    Diagnoses and all orders for this visit:    Bilateral carpal tunnel syndrome  -     Ambulatory Referral to Family Practice  -     Case Request; Standing  -     CBC & Differential; Future  -     Basic Metabolic Panel; Future  -     Type & Screen; Future  -     ECG 12 Lead; Future  -     Case Request    Cervical spinal stenosis  -     Ambulatory Referral to [facility]    Cubital tunnel syndrome on right  -     Ambulatory Referral to [facility]  -     Case Request; Standing  -     CBC & Differential; Future  -     Basic Metabolic Panel; Future  -     Type & Screen; Future  -     ECG 12 Lead; Future  -     Case Request    Bone lesion  -     NM bone scan whole body; Future    Overweight (BMI 25.0-29.9)    Non-tobacco user    Other orders  -     Follow Anesthesia Guidelines / Protocol; Future  -     Obtain Informed Consent; Future  -     Provide NPO Instructions to Patient; Future  -     Chlorhexidine Skin Prep; Future        Return for after surgery.    Level of Risk: High, main OR  MDM: High Complexity  (Mod = 58270, High = 51097)    Thank you, for allowing me to continue to participate in the care of this patient.    Sincerely,  Gregorio Pringle MD

## 2020-05-11 ENCOUNTER — PATIENT MESSAGE (OUTPATIENT)
Dept: NEUROSURGERY | Facility: CLINIC | Age: 70
End: 2020-05-11

## 2020-05-14 ENCOUNTER — DOCUMENTATION (OUTPATIENT)
Dept: NEUROSURGERY | Facility: CLINIC | Age: 70
End: 2020-05-14

## 2020-05-14 NOTE — PROGRESS NOTES
Spoke with Vira at [facility]. Message provided regarding request for patient's upcoming surgery and need for clearance. Patient has already called with this request. Message sent to MARYAM Ortiz. She will review if patient needs appointment or if clearance can be provided as verbal. Advised would fax prework once obtained. Requested call back with determination regarding if appointment was needed. Vira voiced understanding.

## 2020-05-27 ENCOUNTER — HOSPITAL ENCOUNTER (OUTPATIENT)
Dept: NUCLEAR MEDICINE | Facility: HOSPITAL | Age: 70
Discharge: HOME | End: 2020-05-27

## 2020-05-27 ENCOUNTER — APPOINTMENT (OUTPATIENT)
Dept: PREADMISSION TESTING | Facility: HOSPITAL | Age: 70
End: 2020-05-27

## 2020-05-27 VITALS
OXYGEN SATURATION: 98 % | HEART RATE: 78 BPM | WEIGHT: 214.51 LBS | DIASTOLIC BLOOD PRESSURE: 93 MMHG | SYSTOLIC BLOOD PRESSURE: 170 MMHG | RESPIRATION RATE: 16 BRPM | BODY MASS INDEX: 29.05 KG/M2 | HEIGHT: 72 IN

## 2020-05-27 DIAGNOSIS — M89.9 BONE LESION: ICD-10-CM

## 2020-05-27 DIAGNOSIS — G56.03 BILATERAL CARPAL TUNNEL SYNDROME: ICD-10-CM

## 2020-05-27 DIAGNOSIS — G56.21 CUBITAL TUNNEL SYNDROME ON RIGHT: ICD-10-CM

## 2020-05-27 LAB
ANION GAP SERPL CALCULATED.3IONS-SCNC: 11 MMOL/L (ref 5–15)
BASOPHILS # BLD AUTO: 0.03 10*3/MM3 (ref 0–0.2)
BASOPHILS NFR BLD AUTO: 0.8 % (ref 0–1.5)
BUN BLD-MCNC: 14 MG/DL (ref 8–23)
BUN/CREAT SERPL: 14.4 (ref 7–25)
CALCIUM SPEC-SCNC: 9.6 MG/DL (ref 8.6–10.5)
CHLORIDE SERPL-SCNC: 105 MMOL/L (ref 98–107)
CO2 SERPL-SCNC: 26 MMOL/L (ref 22–29)
CREAT BLD-MCNC: 0.97 MG/DL (ref 0.76–1.27)
DEPRECATED RDW RBC AUTO: 45.9 FL (ref 37–54)
EOSINOPHIL # BLD AUTO: 0.11 10*3/MM3 (ref 0–0.4)
EOSINOPHIL NFR BLD AUTO: 2.9 % (ref 0.3–6.2)
ERYTHROCYTE [DISTWIDTH] IN BLOOD BY AUTOMATED COUNT: 13.2 % (ref 12.3–15.4)
GFR SERPL CREATININE-BSD FRML MDRD: 77 ML/MIN/1.73
GLUCOSE BLD-MCNC: 102 MG/DL (ref 65–99)
HCT VFR BLD AUTO: 44.3 % (ref 37.5–51)
HGB BLD-MCNC: 14.7 G/DL (ref 13–17.7)
IMM GRANULOCYTES # BLD AUTO: 0.01 10*3/MM3 (ref 0–0.05)
IMM GRANULOCYTES NFR BLD AUTO: 0.3 % (ref 0–0.5)
LYMPHOCYTES # BLD AUTO: 0.79 10*3/MM3 (ref 0.7–3.1)
LYMPHOCYTES NFR BLD AUTO: 21 % (ref 19.6–45.3)
MCH RBC QN AUTO: 30.9 PG (ref 26.6–33)
MCHC RBC AUTO-ENTMCNC: 33.2 G/DL (ref 31.5–35.7)
MCV RBC AUTO: 93.1 FL (ref 79–97)
MONOCYTES # BLD AUTO: 0.37 10*3/MM3 (ref 0.1–0.9)
MONOCYTES NFR BLD AUTO: 9.8 % (ref 5–12)
NEUTROPHILS # BLD AUTO: 2.46 10*3/MM3 (ref 1.7–7)
NEUTROPHILS NFR BLD AUTO: 65.2 % (ref 42.7–76)
NRBC BLD AUTO-RTO: 0 /100 WBC (ref 0–0.2)
PLATELET # BLD AUTO: 225 10*3/MM3 (ref 140–450)
PMV BLD AUTO: 10.2 FL (ref 6–12)
POTASSIUM BLD-SCNC: 5.3 MMOL/L (ref 3.5–5.2)
RBC # BLD AUTO: 4.76 10*6/MM3 (ref 4.14–5.8)
SODIUM BLD-SCNC: 142 MMOL/L (ref 136–145)
WBC NRBC COR # BLD: 3.77 10*3/MM3 (ref 3.4–10.8)

## 2020-05-27 PROCEDURE — A9561 TC99M OXIDRONATE: HCPCS | Performed by: NEUROLOGICAL SURGERY

## 2020-05-27 PROCEDURE — 78306 BONE IMAGING WHOLE BODY: CPT

## 2020-05-27 PROCEDURE — 85025 COMPLETE CBC W/AUTO DIFF WBC: CPT | Performed by: NEUROLOGICAL SURGERY

## 2020-05-27 PROCEDURE — 36415 COLL VENOUS BLD VENIPUNCTURE: CPT

## 2020-05-27 PROCEDURE — 0 TECHNETIUM OXIDRONATE KIT: Performed by: NEUROLOGICAL SURGERY

## 2020-05-27 PROCEDURE — 93010 ELECTROCARDIOGRAM REPORT: CPT | Performed by: INTERNAL MEDICINE

## 2020-05-27 PROCEDURE — 80048 BASIC METABOLIC PNL TOTAL CA: CPT | Performed by: NEUROLOGICAL SURGERY

## 2020-05-27 PROCEDURE — 93005 ELECTROCARDIOGRAM TRACING: CPT

## 2020-05-27 RX ADMIN — TECHNETIUM TC 99M OXIDRONATE 1 DOSE: 3.15 INJECTION, POWDER, LYOPHILIZED, FOR SOLUTION INTRAVENOUS at 08:20

## 2020-05-31 ENCOUNTER — RESULTS ENCOUNTER (OUTPATIENT)
Dept: UROLOGY | Facility: CLINIC | Age: 70
End: 2020-05-31

## 2020-05-31 DIAGNOSIS — R97.20 ELEVATED PROSTATE SPECIFIC ANTIGEN (PSA): ICD-10-CM

## 2020-06-04 ENCOUNTER — TELEPHONE (OUTPATIENT)
Dept: NEUROSURGERY | Facility: CLINIC | Age: 70
End: 2020-06-04

## 2020-06-04 NOTE — TELEPHONE ENCOUNTER
----- Message from Gregorio Pringle MD sent at 6/3/2020  9:03 PM CDT -----  let him know that his bone scan is negative and no evidence of cancer.  Lets proceed with Caral tunnel release

## 2020-06-05 ENCOUNTER — TRANSCRIBE ORDERS (OUTPATIENT)
Dept: ADMINISTRATIVE | Facility: HOSPITAL | Age: 70
End: 2020-06-05

## 2020-06-05 DIAGNOSIS — Z01.818 PRE-OP TESTING: Primary | ICD-10-CM

## 2020-06-08 ENCOUNTER — LAB (OUTPATIENT)
Dept: LAB | Facility: HOSPITAL | Age: 70
End: 2020-06-08

## 2020-06-08 PROCEDURE — U0003 INFECTIOUS AGENT DETECTION BY NUCLEIC ACID (DNA OR RNA); SEVERE ACUTE RESPIRATORY SYNDROME CORONAVIRUS 2 (SARS-COV-2) (CORONAVIRUS DISEASE [COVID-19]), AMPLIFIED PROBE TECHNIQUE, MAKING USE OF HIGH THROUGHPUT TECHNOLOGIES AS DESCRIBED BY CMS-2020-01-R: HCPCS | Performed by: NEUROLOGICAL SURGERY

## 2020-06-09 LAB
COVID LABCORP PRIORITY: NORMAL
SARS-COV-2 RNA RESP QL NAA+PROBE: NOT DETECTED

## 2020-06-11 ENCOUNTER — HOSPITAL ENCOUNTER (OUTPATIENT)
Facility: HOSPITAL | Age: 70
Setting detail: HOSPITAL OUTPATIENT SURGERY
Discharge: HOME | End: 2020-06-11
Attending: NEUROLOGICAL SURGERY | Admitting: NEUROLOGICAL SURGERY

## 2020-06-11 ENCOUNTER — ANESTHESIA (OUTPATIENT)
Dept: PERIOP | Facility: HOSPITAL | Age: 70
End: 2020-06-11

## 2020-06-11 ENCOUNTER — ANESTHESIA EVENT (OUTPATIENT)
Dept: PERIOP | Facility: HOSPITAL | Age: 70
End: 2020-06-11

## 2020-06-11 VITALS
TEMPERATURE: 97.5 F | OXYGEN SATURATION: 97 % | DIASTOLIC BLOOD PRESSURE: 93 MMHG | SYSTOLIC BLOOD PRESSURE: 154 MMHG | HEART RATE: 61 BPM | RESPIRATION RATE: 16 BRPM

## 2020-06-11 DIAGNOSIS — G56.03 BILATERAL CARPAL TUNNEL SYNDROME: Primary | ICD-10-CM

## 2020-06-11 DIAGNOSIS — G56.21 CUBITAL TUNNEL SYNDROME ON RIGHT: ICD-10-CM

## 2020-06-11 LAB
ABO GROUP BLD: NORMAL
BLD GP AB SCN SERPL QL: NEGATIVE
RH BLD: NEGATIVE
T&S EXPIRATION DATE: NORMAL

## 2020-06-11 PROCEDURE — S0260 H&P FOR SURGERY: HCPCS | Performed by: NEUROLOGICAL SURGERY

## 2020-06-11 PROCEDURE — 86901 BLOOD TYPING SEROLOGIC RH(D): CPT | Performed by: NEUROLOGICAL SURGERY

## 2020-06-11 PROCEDURE — 64718 REVISE ULNAR NERVE AT ELBOW: CPT | Performed by: NEUROLOGICAL SURGERY

## 2020-06-11 PROCEDURE — 25010000002 ONDANSETRON PER 1 MG: Performed by: NURSE ANESTHETIST, CERTIFIED REGISTERED

## 2020-06-11 PROCEDURE — 25010000002 MIDAZOLAM PER 1 MG: Performed by: ANESTHESIOLOGY

## 2020-06-11 PROCEDURE — 86900 BLOOD TYPING SEROLOGIC ABO: CPT | Performed by: NEUROLOGICAL SURGERY

## 2020-06-11 PROCEDURE — 25010000002 PROPOFOL 10 MG/ML EMULSION: Performed by: NURSE ANESTHETIST, CERTIFIED REGISTERED

## 2020-06-11 PROCEDURE — 86850 RBC ANTIBODY SCREEN: CPT | Performed by: NEUROLOGICAL SURGERY

## 2020-06-11 PROCEDURE — 64721 CARPAL TUNNEL SURGERY: CPT | Performed by: NEUROLOGICAL SURGERY

## 2020-06-11 PROCEDURE — 25010000002 DEXAMETHASONE PER 1 MG: Performed by: ANESTHESIOLOGY

## 2020-06-11 PROCEDURE — 25010000002 FENTANYL CITRATE (PF) 100 MCG/2ML SOLUTION: Performed by: NURSE ANESTHETIST, CERTIFIED REGISTERED

## 2020-06-11 RX ORDER — SODIUM CHLORIDE, SODIUM LACTATE, POTASSIUM CHLORIDE, CALCIUM CHLORIDE 600; 310; 30; 20 MG/100ML; MG/100ML; MG/100ML; MG/100ML
100 INJECTION, SOLUTION INTRAVENOUS CONTINUOUS
Status: DISCONTINUED | OUTPATIENT
Start: 2020-06-11 | End: 2020-06-11 | Stop reason: HOSPADM

## 2020-06-11 RX ORDER — LABETALOL HYDROCHLORIDE 5 MG/ML
5 INJECTION, SOLUTION INTRAVENOUS
Status: DISCONTINUED | OUTPATIENT
Start: 2020-06-11 | End: 2020-06-11 | Stop reason: HOSPADM

## 2020-06-11 RX ORDER — BUPIVACAINE HCL/0.9 % NACL/PF 0.1 %
2 PLASTIC BAG, INJECTION (ML) EPIDURAL ONCE
Status: COMPLETED | OUTPATIENT
Start: 2020-06-11 | End: 2020-06-11

## 2020-06-11 RX ORDER — SODIUM CHLORIDE 0.9 % (FLUSH) 0.9 %
3-10 SYRINGE (ML) INJECTION AS NEEDED
Status: DISCONTINUED | OUTPATIENT
Start: 2020-06-11 | End: 2020-06-11 | Stop reason: HOSPADM

## 2020-06-11 RX ORDER — ONDANSETRON 2 MG/ML
INJECTION INTRAMUSCULAR; INTRAVENOUS AS NEEDED
Status: DISCONTINUED | OUTPATIENT
Start: 2020-06-11 | End: 2020-06-11 | Stop reason: SURG

## 2020-06-11 RX ORDER — FENTANYL CITRATE 50 UG/ML
INJECTION, SOLUTION INTRAMUSCULAR; INTRAVENOUS AS NEEDED
Status: DISCONTINUED | OUTPATIENT
Start: 2020-06-11 | End: 2020-06-11 | Stop reason: SURG

## 2020-06-11 RX ORDER — SODIUM CHLORIDE 0.9 % (FLUSH) 0.9 %
3 SYRINGE (ML) INJECTION EVERY 12 HOURS SCHEDULED
Status: DISCONTINUED | OUTPATIENT
Start: 2020-06-11 | End: 2020-06-11 | Stop reason: HOSPADM

## 2020-06-11 RX ORDER — MAGNESIUM HYDROXIDE 1200 MG/15ML
LIQUID ORAL AS NEEDED
Status: DISCONTINUED | OUTPATIENT
Start: 2020-06-11 | End: 2020-06-11 | Stop reason: HOSPADM

## 2020-06-11 RX ORDER — OXYCODONE AND ACETAMINOPHEN 7.5; 325 MG/1; MG/1
2 TABLET ORAL EVERY 4 HOURS PRN
Status: DISCONTINUED | OUTPATIENT
Start: 2020-06-11 | End: 2020-06-11 | Stop reason: HOSPADM

## 2020-06-11 RX ORDER — BUPIVACAINE HYDROCHLORIDE AND EPINEPHRINE 2.5; 5 MG/ML; UG/ML
INJECTION, SOLUTION EPIDURAL; INFILTRATION; INTRACAUDAL; PERINEURAL AS NEEDED
Status: DISCONTINUED | OUTPATIENT
Start: 2020-06-11 | End: 2020-06-11 | Stop reason: HOSPADM

## 2020-06-11 RX ORDER — AMLODIPINE BESYLATE 2.5 MG/1
5 TABLET ORAL DAILY
COMMUNITY
Start: 2020-06-08 | End: 2021-09-09

## 2020-06-11 RX ORDER — FENTANYL CITRATE 50 UG/ML
25 INJECTION, SOLUTION INTRAMUSCULAR; INTRAVENOUS
Status: DISCONTINUED | OUTPATIENT
Start: 2020-06-11 | End: 2020-06-11 | Stop reason: HOSPADM

## 2020-06-11 RX ORDER — NALOXONE HCL 0.4 MG/ML
0.4 VIAL (ML) INJECTION AS NEEDED
Status: DISCONTINUED | OUTPATIENT
Start: 2020-06-11 | End: 2020-06-11 | Stop reason: HOSPADM

## 2020-06-11 RX ORDER — SODIUM CHLORIDE, SODIUM LACTATE, POTASSIUM CHLORIDE, CALCIUM CHLORIDE 600; 310; 30; 20 MG/100ML; MG/100ML; MG/100ML; MG/100ML
1000 INJECTION, SOLUTION INTRAVENOUS CONTINUOUS
Status: DISCONTINUED | OUTPATIENT
Start: 2020-06-11 | End: 2020-06-11 | Stop reason: HOSPADM

## 2020-06-11 RX ORDER — SODIUM CHLORIDE 0.9 % (FLUSH) 0.9 %
3 SYRINGE (ML) INJECTION AS NEEDED
Status: DISCONTINUED | OUTPATIENT
Start: 2020-06-11 | End: 2020-06-11 | Stop reason: HOSPADM

## 2020-06-11 RX ORDER — ONDANSETRON 2 MG/ML
4 INJECTION INTRAMUSCULAR; INTRAVENOUS ONCE AS NEEDED
Status: DISCONTINUED | OUTPATIENT
Start: 2020-06-11 | End: 2020-06-11 | Stop reason: HOSPADM

## 2020-06-11 RX ORDER — DEXAMETHASONE SODIUM PHOSPHATE 4 MG/ML
4 INJECTION, SOLUTION INTRA-ARTICULAR; INTRALESIONAL; INTRAMUSCULAR; INTRAVENOUS; SOFT TISSUE ONCE AS NEEDED
Status: COMPLETED | OUTPATIENT
Start: 2020-06-11 | End: 2020-06-11

## 2020-06-11 RX ORDER — ACETAMINOPHEN 500 MG
1000 TABLET ORAL ONCE
Status: COMPLETED | OUTPATIENT
Start: 2020-06-11 | End: 2020-06-11

## 2020-06-11 RX ORDER — MIDAZOLAM HYDROCHLORIDE 1 MG/ML
2 INJECTION INTRAMUSCULAR; INTRAVENOUS
Status: DISCONTINUED | OUTPATIENT
Start: 2020-06-11 | End: 2020-06-11 | Stop reason: HOSPADM

## 2020-06-11 RX ORDER — MIDAZOLAM HYDROCHLORIDE 1 MG/ML
1 INJECTION INTRAMUSCULAR; INTRAVENOUS
Status: DISCONTINUED | OUTPATIENT
Start: 2020-06-11 | End: 2020-06-11 | Stop reason: HOSPADM

## 2020-06-11 RX ORDER — LIDOCAINE HYDROCHLORIDE 10 MG/ML
0.5 INJECTION, SOLUTION EPIDURAL; INFILTRATION; INTRACAUDAL; PERINEURAL ONCE AS NEEDED
Status: DISCONTINUED | OUTPATIENT
Start: 2020-06-11 | End: 2020-06-11

## 2020-06-11 RX ORDER — FLUMAZENIL 0.1 MG/ML
0.2 INJECTION INTRAVENOUS AS NEEDED
Status: DISCONTINUED | OUTPATIENT
Start: 2020-06-11 | End: 2020-06-11 | Stop reason: HOSPADM

## 2020-06-11 RX ORDER — OXYCODONE HYDROCHLORIDE AND ACETAMINOPHEN 5; 325 MG/1; MG/1
1 TABLET ORAL EVERY 6 HOURS PRN
Qty: 30 TABLET | Refills: 0 | Status: SHIPPED | OUTPATIENT
Start: 2020-06-11 | End: 2020-08-05

## 2020-06-11 RX ORDER — LIDOCAINE HYDROCHLORIDE 10 MG/ML
0.5 INJECTION, SOLUTION EPIDURAL; INFILTRATION; INTRACAUDAL; PERINEURAL ONCE AS NEEDED
Status: DISCONTINUED | OUTPATIENT
Start: 2020-06-11 | End: 2020-06-11 | Stop reason: HOSPADM

## 2020-06-11 RX ORDER — OXYCODONE AND ACETAMINOPHEN 10; 325 MG/1; MG/1
1 TABLET ORAL ONCE AS NEEDED
Status: DISCONTINUED | OUTPATIENT
Start: 2020-06-11 | End: 2020-06-11 | Stop reason: HOSPADM

## 2020-06-11 RX ORDER — PROPOFOL 10 MG/ML
VIAL (ML) INTRAVENOUS AS NEEDED
Status: DISCONTINUED | OUTPATIENT
Start: 2020-06-11 | End: 2020-06-11 | Stop reason: SURG

## 2020-06-11 RX ADMIN — ONDANSETRON HYDROCHLORIDE 4 MG: 2 SOLUTION INTRAMUSCULAR; INTRAVENOUS at 09:36

## 2020-06-11 RX ADMIN — ACETAMINOPHEN 1000 MG: 500 TABLET, FILM COATED ORAL at 07:48

## 2020-06-11 RX ADMIN — SODIUM CHLORIDE, POTASSIUM CHLORIDE, SODIUM LACTATE AND CALCIUM CHLORIDE 1000 ML: 600; 310; 30; 20 INJECTION, SOLUTION INTRAVENOUS at 07:13

## 2020-06-11 RX ADMIN — MIDAZOLAM 2 MG: 1 INJECTION INTRAMUSCULAR; INTRAVENOUS at 07:58

## 2020-06-11 RX ADMIN — FENTANYL CITRATE 100 MCG: 50 INJECTION, SOLUTION INTRAMUSCULAR; INTRAVENOUS at 08:20

## 2020-06-11 RX ADMIN — Medication 2 G: at 08:23

## 2020-06-11 RX ADMIN — PROPOFOL 130 MG: 10 INJECTION, EMULSION INTRAVENOUS at 08:20

## 2020-06-11 RX ADMIN — DEXAMETHASONE SODIUM PHOSPHATE 4 MG: 4 INJECTION, SOLUTION INTRAMUSCULAR; INTRAVENOUS at 07:47

## 2020-06-11 NOTE — H&P
Chief complaint:        Chief Complaint   Patient presents with   • Follow-up       Here to discuss surgical options. Complains of neck, B arm pain with numbness B hands. Has failed therapy. Known diagnosis of carpal tunnel, has tried/failed splinting. Describes leg weakness upon standing after sitting for long periods.             Subjective         HPI:   Interval History: Korey returns today to discuss cervical surgery and carpal tunnel release.     Korey symptom originally began 3 years ago     Currently Korey complains of bilateral arm pain and relatively minor neck pain that has been present for 3 years  Pain: Korey pain is a 4/10 in severity and his percentage of distribution is predominantly biceps and triceps and radiating into the first through third fingers on each hand.  Numbness: All 5 fingers on the right hand and predominantly first through third fingers on the left hand  Fine Motor Skills: Increasing difficulty with buttons.  Picking up small objects is becoming more difficult.  He finds it when he exercises he has difficulty holding onto the bar.  Gait: No changes.  He does not use a cane or walker.  Bowel and Bladder Function: None     Alternative therapies: Completed a 6-week trial of physical therapy and occupational therapy to focus on his neck without any improvement.  Additionally he is completed bilateral wrist splints for 6 weeks without any improvement.     In the interim he is also received an EMG nerve conduction study he is here today for review of results.     Oswestry Disability Index, Cervical = 12%    Score   Pain Intensity Very mild pain - 1   Personal Care Look after myself normally without causing extra pain-0   Lifting Lift heavy weights but gives extra pain-1   Reading Read without pain-0   Headaches Slight infrequent headaches-1   Concentration Concentrate fully slight difficulty-1   Work I can only do my usual work only-1   Driving I can drive-0   Sleeping Less than 1  hour sleepiness-1   Recreation All recreational activities-0      SCORE INTERPRETATION OF THE OSWESTRY NECK DISABILITY QUESTIONNAIRE  0-8: No disability  10-28: Mild disability   30-48: Moderate disability   50-68: Severe disability   >70: Complete disability  (Arsh et al, 1980)        Modified [place] [employer] (mJOA) score  CATEGORY SCORE   Upper extremity Motor Subscore Mild difficulty buttoning shirt-4   Lower Extremity Subscore Mild imbalance when standing or walking-6   Upper Extremity Sensory Score Mild loss of hand sensation-2   Urinary Function Subscore Normal urination-3   TOTAL = 15/18     The mJOA is an 18 point score of functional disability specific to cervical myelopathy.   15-18: Mild Myelopathy  12-14: Moderate Myelopathy  <12: Severe Myelopathy  Emiliano et al. (1991). Alexey et al.         Review of Systems   HENT: Negative.    Eyes: Negative.    Respiratory: Negative.    Cardiovascular: Negative.    Gastrointestinal: Negative.    Endocrine: Negative.    Genitourinary: Negative.    Musculoskeletal: Negative.    Skin: Negative.    Allergic/Immunologic: Negative.    Neurological: Positive for weakness and numbness.   Hematological: Negative.    Psychiatric/Behavioral: Negative.          PFSH:       Past Medical History:   Diagnosis Date   • Hypertension     • Kidney stones     • Shingles                 Past Surgical History:   Procedure Laterality Date   • CATARACT EXTRACTION       • COLONOSCOPY N/A 10/8/2019     Procedure: COLONOSCOPY WITH ANESTHESIA;  Surgeon: Contreras Inman DO;  Location: [facility] ENDOSCOPY;  Service: Gastroenterology   • KNEE SURGERY         1974            Objective                 Current Outpatient Medications   Medication Sig Dispense Refill   • acyclovir (ZOVIRAX) 400 MG tablet Take 400 mg by mouth 2 (Two) Times a Day. Take no more than 5 doses a day.       • aspirin 81 MG EC tablet Take 81 mg by mouth As Needed.       • cholecalciferol (VITAMIN D3)  "1000 units tablet Take 1,000 Units by mouth Daily.       • dorzolamide (TRUSOPT) 2 % ophthalmic solution 1 drop 3 (Three) Times a Day.       • doxazosin (CARDURA) 1 MG tablet Take 1 mg by mouth Every Night.       • fluorometholone (FML) 0.1 % ophthalmic suspension 1 drop Every 4 (Four) Hours.          No current facility-administered medications for this visit.          Vital Signs  Ht 185.4 cm (73\")   Wt 94.3 kg (208 lb)   BMI 27.44 kg/m²   Physical Exam   Constitutional: He is oriented to person, place, and time.   Eyes: Pupils are equal, round, and reactive to light. EOM are normal.   Neurological: He is oriented to person, place, and time. Gait normal.   Reflex Scores:       Tricep reflexes are 2+ on the right side and 3+ on the left side.       Bicep reflexes are 2+ on the right side and 3+ on the left side.       Brachioradialis reflexes are 2+ on the right side and 3+ on the left side.       Patellar reflexes are 3+ on the right side and 3+ on the left side.       Achilles reflexes are 1+ on the right side and 1+ on the left side.  Psychiatric: His speech is normal.      Neurologic Exam      Mental Status   Oriented to person, place, and time.   Speech: speech is normal      Cranial Nerves      CN II   Visual fields full to confrontation.      CN III, IV, VI   Pupils are equal, round, and reactive to light.  Extraocular motions are normal.      CN V   Right facial sensation deficit: none  Left facial sensation deficit: none     CN VII   Facial expression full, symmetric.      CN VIII   Hearing: intact     CN IX, X   Palate: symmetric     CN XI   Right sternocleidomastoid strength: normal  Left sternocleidomastoid strength: normal     CN XII   Tongue deviation: none     Motor Exam      Strength   Right deltoid: 5/5  Left deltoid: 5/5  Right biceps: 5/5  Left biceps: 5/5  Right triceps: 5/5  Left triceps: 5/5  Right interossei: 4/5  Left interossei: 5/5  Right iliopsoas: 5/5  Left iliopsoas: 5/5  Right " quadriceps: 5/5  Left quadriceps: 5/5  Right anterior tibial: 5/5  Left anterior tibial: 5/5  Right gastroc: 5/5  Left gastroc: 5/5     Sensory Exam   Right arm light touch: normal  Left arm light touch: normal  Right leg light touch: normal  Left leg light touch: normal     Gait, Coordination, and Reflexes      Gait  Gait: normal     Reflexes   Right brachioradialis: 2+  Left brachioradialis: 3+  Right biceps: 2+  Left biceps: 3+  Right triceps: 2+  Left triceps: 3+  Right patellar: 3+  Left patellar: 3+  Right achilles: 1+  Left achilles: 1+  Right Obrien: absent  Left Obrien: absent        Male  strength (pounds)  AGE Right Hand RH Norms Left Hand LH Norms   20-24   121+20.6   104+21.8   25-29   120+23.0   110+16.2   30-34   121+22.4   110+21.7   35-39   119+24   113+21.7   40-44   117+20.7   112+18.7   45-49   110+23.0   101+22.8   50-54   113+18.1   102+17   55-59   101+26.7   83+23.4   60-64   90+20.4   77+20.3   65-69 57*,85 91+20.6 71,80 76.8+19.8   70-74   75+21.5   65+18.1   75+   66+21.0   55+17.0   (STEVE Moralez et al; Hand Dynometer: Effects of trials and sessions.  Perpetual and Motor Skills 61:195-8, 1985)     Imaging: (independent review and interpretation)  10/16/19 MRI of the cervical spine shows no acute fractures, bone marrow and STIR signal change within the C7 vertebral body, no T2 signal change within the central cord, retrolisthesis of C5 over C6, multilevel degenerative changes resulting in thecal sac and left foraminal narrowing at C6-7 and central canal and bilateral foraminal stenosis at C5-6.                           ASSESSMENT/ PLAN:  Korey Ruelas is a 69 y.o. male hypertension, renal stones, shingles, mono exposure, and he is overweight.  He presents with a new problem of occasional neck discomfort, primarily bilateral upper extremity pain, numbness, tingling, and weakness; 5% neck, 95% upper extremities.  Physical exam findings of right  strength weakness, 4/5 right bicep  weakness, positive Tinel's sign over the bilateral cubital fossae and median nerve, hyperreflexia to the left upper extremity, and an antalgic gait.  His imaging shows bone marrow and STIR signal change within the C7 vertebral body, no T2 signal change within the central cord, retrolisthesis of C5 over C6, multilevel degenerative changes resulting in thecal sac and left foraminal narrowing at C6-7 and central canal and bilateral foraminal stenosis at C5-6.  Imaging discussed and reviewed with patient and family.     TREATMENT RECOMMENDATIONS ...  Cervical Spondylitic Myelopathy  Cervical stenosis at C5/6 and C6/7  DDX: Cervical stenosis, facet arthropathy, brachial plexopathy, peripheral neuropathy     Natural history of cervical spondylitic myelopathy  1. In younger patients with mild CSM (age younger than 75 years and mJOA scale score > 12), both operative and nonoperative management options be offered, as objectively measurable deterioration in function is rarely seen acutely (quality of evidence: Class I; strength of recommendation, B).   2. Also the clinical gains after nonoperative treatment are often maintained over 3 years in 70% of cases (quality of evidence, Class III; strength of recommendation, D).   3. The course of CSM is mixed, with many patients experiencing a slow, stepwise decline. We addressed the fact that long periods of quiescence are not uncommon and that a subgroup of patients may have interim improvement (quality of evidence, Class III; strength of recommendation, D).   4. However, long periods of severe stenosis over many years are associated with demyelination of white matter and may result in necrosis of both gray and white matter leading to potentially irreversible deficit (quality of evidence Class III; strength of recommendation, D).      Expected Functional Outcome After Surgery  1. Korey is 69 y.o. and his symptoms have been present for 3 years.  Korey's preoperative  [place]  Or[employer] Association Questionnaire (mJOA) is 15 and Oswestry Neck Disability Index (NDI) is 12.  These validated functional outcome measure will be trended postoperatively to better quantify his recovery (quality of evidence, Class II; strength of recommendation, B).   2. Recovery varies  a. The mean mJOA scores improve from 10.5 to 14.1  (Marie et al., 1993)  b. 59% of patients recovering significant neuro function.  (Marie et al., 1993) (Alexis et al., 2002)  3. Duration of symptoms affects recovery rate  a. Symptom duration correlates with neurological recovery rate. (Marie et al., 1993)  b. 73% of patients symptoms for <2 yrs improved postop outcomes, whereas 53% with symptoms >2 yrs had improvement. (Chagas et al., 2005)  c. In elderly patients, duration of symptoms (<1 yr) was predictive of an excellent neurological recovery. (Hallie et al., 2003)  d. Among elderly patients both symptom duration and severity of canal stenosis affected the neurological outcome. (Alexis et al., 2002)  4. Age affects recovery rate  a. 70% of patients <60 yrs old had an improved outcome score, whereas 56% of patients >60 yrs had an improved outcome score. (Chagas et al., 2005)  b. Some studies show limited effect of age ... (Hallie et al., 2003)  (Marie et al., 1993)  5. Symptom severity affects recovery  a. Among younger patients only symptom severity affected recovery. (Alexis et al., 2002)  6. The prognostic value of clinical factors such as age, duration of symptoms, and preoperative neurological function results were discussed with Korey (quality of evidence, Class III; strength of recommendation, D)     Radiographs  1. Preoperative T2 hyperintensity at multiple levels in the cervical cord predicts poor surgical outcome (quality of evidence, Class III; strength of recommendation, D).   2. Preoperative T1 hypointensity combined with T2 hyperintensity at the any single level predicts a poor surgical outcome (quality of  evidence, Class III; strength of recommendation, D).   3. Spinal cord atrophy (transverse area < 45 mm2) may predict worse outcome (quality of evidence, Class III; strength of recommendation, D).      Surgical decision Making  1. In patients with cervical stenosis without myelopathy who have abnormal EMG findings, decompression should be considered because the presence of EMG abnormalities or clinical radiculopathy is associated with development of symptomatic CSM (quality of evidence, Class I; strength of recommendation, B).  2. MILD CSM (mJOA scale score >12) be treated in the short term (3 years) either with surgical decompression or with nonoperative therapy (prolonged immobilization in a stiff cervical collar, “low-risk” activity modification or bed rest, and antiinflammatory medications) based on patient preference (quality of evidence, Class II; strength of recommendation, C).      Korey has elected to proceed with conservative management and would like to address his carpal tunnel syndrome first.  Therefore we will defer surgical decision on his neck until after carpal tunnel surgery.     Bilateral Carpal Tunnel Syndrome, Right>Left  Differential diagnosis: Carpal tunnel syndrome, diabetic neuropathy, thoracic outlet syndrome, complex regional pain syndrome, cervical stenosis with radiculopathy.     At this time I favor carpal tunnel syndrome as a cause for the patient's numbness. He has EMG and nerve conduction studies that confirm entrapment at the bilateral wrist.    Right more severe than left.     Risks of the procedure, including but not limited to infection (3%), pain, bleeding at the operative site, damage to local structures such as tendons, persistent pain, or generalized complications of anesthesia including stroke, coma, MI, or death.     Korey has completed a rigorous course of conservative therapy which includes rest and bracing.  We discussed risk, benefits, and complications of surgery  and at this point the patient would like proceed with right carpal tunnel release.     Right Cubital Tunnel Syndrome, Mild   Differential diagnosis:  Cubital tunnel syndrome, C8 or T1 radiculopathy, diabetic neuropathy, thoracic outlet syndrome, complex regional pain syndrome.     At this time I favor cubital tunnel syndrome as a cause for the patient's numbness and the objective findings of fourth and fifth digit numbness on the right hand. He has EMG and nerve conduction studies that confirm entrapment at the right elbow.       Risks of the procedure, including but not limited to infection (3%), pain, bleeding at the operative site, damage to local structures such as tendons, persistent pain, or generalized complications of anesthesia including stroke, coma, MI, or death.     Korey has completed a rigorous course of conservative therapy which includes rest and bracing.  We discussed risk, benefits, and complications of surgery and at this point the patient would like proceed with right cubital tunnel release.        Abnormal lesion in the C6 vertebral body   Known 11 mm lung nodule   Korey has an atypical mildly lytic lesion in the C7 vertebral body.  This does have a mildly sclerotic border.  It appears to respect anatomical landmarks.  A CT scan of the cervical spine was obtained for better characterization and did not show the typical pattern for hemangioma.  Given his history of having a lung nodule metastatic disease moves up in the differential.  Therefore I like to obtain a nuclear medicine study to evaluate the metabolic activity.  This can be done concomitantly with his carpal tunnel surgery.       Obesity counseling  BMI today is 26.3.  Information on the DASH diet provided in the AVS.  We will continue to provided diet and exercise information with the goal of weight loss at each scheduled appointment.            1. Bilateral carpal tunnel syndrome    2. Cervical spinal stenosis    3. Cubital tunnel  syndrome on right    4. Bone lesion    5. Overweight (BMI 25.0-29.9)    6. Non-tobacco user          Recommendations:  Korey was seen today for follow-up.     Diagnoses and all orders for this visit:     Bilateral carpal tunnel syndrome  -     Ambulatory Referral to Family Practice  -     Case Request; Standing  -     CBC & Differential; Future  -     Basic Metabolic Panel; Future  -     Type & Screen; Future  -     ECG 12 Lead; Future  -     Case Request     Cervical spinal stenosis  -     Ambulatory Referral to Family Practice     Cubital tunnel syndrome on right  -     Ambulatory Referral to Family Practice  -     Case Request; Standing  -     CBC & Differential; Future  -     Basic Metabolic Panel; Future  -     Type & Screen; Future  -     ECG 12 Lead; Future  -     Case Request     Bone lesion  -     NM bone scan whole body; Future     Overweight (BMI 25.0-29.9)     Non-tobacco user     Other orders  -     Follow Anesthesia Guidelines / Protocol; Future  -     Obtain Informed Consent; Future  -     Provide NPO Instructions to Patient; Future  -     Chlorhexidine Skin Prep; Future           Return for after surgery.     Level of Risk: High, main OR  MDM: High Complexity  (Mod = 13903, High = 70539)     Thank you, for allowing me to continue to participate in the care of this patient.     Sincerely,  Gregorio Pringle MD

## 2020-06-11 NOTE — DISCHARGE INSTRUCTIONS
YOUR NEXT PAIN MEDICATION IS DUE AT______________         General Anesthesia, Adult, Care After  Refer to this sheet in the next few weeks. These instructions provide you with information on caring for yourself after your procedure. Your health care provider may also give you more specific instructions. Your treatment has been planned according to current medical practices, but problems sometimes occur. Call your health care provider if you have any problems or questions after your procedure.  WHAT TO EXPECT AFTER THE PROCEDURE  After the procedure, it is typical to experience:  · Sleepiness.  · Nausea and vomiting.  HOME CARE INSTRUCTIONS  · For the first 24 hours after general anesthesia:  ¨ Have a responsible person with you.  ¨ Do not drive a car. If you are alone, do not take public transportation.  ¨ Do not drink alcohol.  ¨ Do not take medicine that has not been prescribed by your health care provider.  ¨ Do not sign important papers or make important decisions.  ¨ You may resume a normal diet and activities as directed by your health care provider.  · Change bandages (dressings) as directed.  · If you have questions or problems that seem related to general anesthesia, call the hospital and ask for the anesthetist or anesthesiologist on call.  SEEK MEDICAL CARE IF:  · You have nausea and vomiting that continue the day after anesthesia.  · You develop a rash.  SEEK IMMEDIATE MEDICAL CARE IF:    · You have difficulty breathing.  · You have chest pain.  · You have any allergic problems.     This information is not intended to replace advice given to you by your health care provider. Make sure you discuss any questions you have with your health care provider.     Document Released: 03/26/2002 Document Revised: 01/08/2016 Document Reviewed: 07/03/2014  [employer] Interactive Patient Education ©2016 [employer]    CALL YOUR PHYSICIAN IF YOU EXPERIENCE  INCREASED PAIN NOT HELPED BY YOUR PAIN MEDICATION.    .                                               Fall Prevention in the Home      Falls can cause injuries. They can happen to people of all ages. There are many things you can do to make your home safe and to help prevent falls.    WHAT CAN I DO ON THE OUTSIDE OF MY HOME?  · Regularly fix the edges of walkways and driveways and fix any cracks.  · Remove anything that might make you trip as you walk through a door, such as a raised step or threshold.  · Trim any bushes or trees on the path to your home.  · Use bright outdoor lighting.  · Clear any walking paths of anything that might make someone trip, such as rocks or tools.  · Regularly check to see if handrails are loose or broken. Make sure that both sides of any steps have handrails.  · Any raised decks and porches should have guardrails on the edges.  · Have any leaves, snow, or ice cleared regularly.  · Use sand or salt on walking paths during winter.  · Clean up any spills in your garage right away. This includes oil or grease spills.  WHAT CAN I DO IN THE BATHROOM?    · Use night lights.  · Install grab bars by the toilet and in the tub and shower. Do not use towel bars as grab bars.  · Use non-skid mats or decals in the tub or shower.  · If you need to sit down in the shower, use a plastic, non-slip stool.  · Keep the floor dry. Clean up any water that spills on the floor as soon as it happens.  · Remove soap buildup in the tub or shower regularly.  · Attach bath mats securely with double-sided non-slip rug tape.  · Do not have throw rugs and other things on the floor that can make you trip.  WHAT CAN I DO IN THE BEDROOM?  · Use night lights.  · Make sure that you have a light by your bed that is easy to reach.  · Do not use any sheets or blankets that are too big for your bed. They should not hang down onto the floor.  · Have a firm chair that has side arms. You can use this for support while you get dressed.  · Do not have throw rugs and other things on the floor  that can make you trip.  WHAT CAN I DO IN THE KITCHEN?  · Clean up any spills right away.  · Avoid walking on wet floors.  · Keep items that you use a lot in easy-to-reach places.  · If you need to reach something above you, use a strong step stool that has a grab bar.  · Keep electrical cords out of the way.  · Do not use floor polish or wax that makes floors slippery. If you must use wax, use non-skid floor wax.  · Do not have throw rugs and other things on the floor that can make you trip.  WHAT CAN I DO WITH MY STAIRS?  · Do not leave any items on the stairs.  · Make sure that there are handrails on both sides of the stairs and use them. Fix handrails that are broken or loose. Make sure that handrails are as long as the stairways.  · Check any carpeting to make sure that it is firmly attached to the stairs. Fix any carpet that is loose or worn.  · Avoid having throw rugs at the top or bottom of the stairs. If you do have throw rugs, attach them to the floor with carpet tape.  · Make sure that you have a light switch at the top of the stairs and the bottom of the stairs. If you do not have them, ask someone to add them for you.  WHAT ELSE CAN I DO TO HELP PREVENT FALLS?  · Wear shoes that:  ¨ Do not have high heels.  ¨ Have rubber bottoms.  ¨ Are comfortable and fit you well.  ¨ Are closed at the toe. Do not wear sandals.  · If you use a stepladder:  ¨ Make sure that it is fully opened. Do not climb a closed stepladder.  ¨ Make sure that both sides of the stepladder are locked into place.  ¨ Ask someone to hold it for you, if possible.  · Clearly izabel and make sure that you can see:  ¨ Any grab bars or handrails.  ¨ First and last steps.  ¨ Where the edge of each step is.  · Use tools that help you move around (mobility aids) if they are needed. These include:  ¨ Canes.  ¨ Walkers.  ¨ Scooters.  ¨ Crutches.  · Turn on the lights when you go into a dark area. Replace any light bulbs as soon as they burn  out.  · Set up your furniture so you have a clear path. Avoid moving your furniture around.  · If any of your floors are uneven, fix them.  · If there are any pets around you, be aware of where they are.  · Review your medicines with your doctor. Some medicines can make you feel dizzy. This can increase your chance of falling.  Ask your doctor what other things that you can do to help prevent falls.     This information is not intended to replace advice given to you by your health care provider. Make sure you discuss any questions you have with your health care provider.     Document Released: 10/14/2010 Document Revised: 05/03/2016 Document Reviewed: 01/22/2016  [employer] Interactive Patient Education ©2016 [employer]     PATIENT/FAMILY/RESPONSIBLE PARTY VERBALIZES UNDERSTANDING OF ABOVE EDUCATION.  COPY OF PAIN SCALE GIVEN AND REVIEWED WITH VERBALIZED UNDERSTANDING.[facility]    Postoperative care following spine surgery  Dear Patient,  You have recently undergone peripheral nerve surgery (Right Carpal and Cubital Tunnel Release) and are now ready to go home. These written instructions are intended to help you to recover quickly.  • If you have ANY QUESTIONS about your condition prior to discharge please ask Dr. Pringle. In particular, if you have concerns about going home discuss them now. We do not want you to go until you are completely comfortable leaving the hospital.   • If you have ANY QUESTIONS about your condition after you go home call your doctor. The number is [phone] which is answered 24 hours a day. During regular working hours a  will connect you to your doctor, one of his partners, or one of our nurses. At night or on weekends the answering service will connect you with the physician on call. DO NOT HESITATE to call. We want to help you with any problems.     Neurological Deficit  Neurological deficits are problems with nerve function like weakness or numbness,  etc. These deficits may be present before or after peripheral nerve surgery. Prior to discharge the nursing staff will also make sure that these deficits are stable or improving. After you go home, if you think any of your numbness or pain are are getting worse, not better, it may be a sign of bleeding, infection, or other problems. Call your doctor. He will order tests and prescribe treatment as needed.    Activity Restrictions  In general after surgery you will be on restricted activities for several weeks to several months depending on the nature of your operation. For six weeks you should avoid heavy lifting (over 8 lbs or roughly a gallon of milk). You may be released by Dr. Pringle earlier. You may return to driving when you feel comfortable enough that you can safely handle your vehicle AND you are not taking narcotic pain medications for 6 hours. This may be as early as 10 - 14 days, but could be longer as instructed by your neurosurgeon. After surgery you may gradually increase your activities, as you are able to tolerate. Walking is an excellent low impact exercise to begin after surgery. You should try to make regular walks of 15 to 30 minutes part of your postoperative recovery as you become able to do so. It typically requires a period of days to a few weeks to reach this level of activity and should be done in a gradual fashion. Your return to work will depend on your job requirements. In general, you may return to light duty work as soon as you feel comfortable and are not taking routine narcotic pain medications.    Medication  It is important to take your medication EXACTLY as prescribed. Some patients are reluctant to take pain medication. It is perfectly fine to take pain medication for several weeks after surgery. We want to eliminate pain whenever possible. Many pain medications can cause nausea (sick to your stomach), constipation (inability to poop), or itching. Nausea may be minimized by  taking the medication with food. Constipation can be relieved by taking stool softeners and/ or laxatives that you can purchase over the counter as needed.    It is important to realize that no pain after surgery is an unrealistic expectation.  Pain medication will never reduce your pain score to zero.  The goal of pain medicine is to reduce your pain to the point you can move, take care of yourself, and participate in therapy.  Make sure to work with your caregiver to determine what is an adequate level of pain control to promote healthy movement and then take your medication to reach this goal.      Please taper your pain medications to avoid long term addiction.  Week 1: Take your pain medication no more than ever 4 hours as needed for severe pain.  Week 2: Take your pain medication no more than ever 6 hours as needed for severe pain.  Week 3: Take your pain medication no more than ever 8 hours as needed for severe pain.  Week 4: Take your pain medication no more than ever 12 hours as needed for severe pain.  Week 5: Take your pain medication no more than ever 24 hours as needed for severe pain.  By Week 6 you should be off of all pain medication.     Wound Care  Your incision is held together with sutures that need to be removed in 2 weeks.     Your incision is held together with dissolvable sutures that do not need to be removed.     Please leave your incision bandage for 2 weeks.  The bandage be removed in the office at your 2 week appointment.      Please make every attempt to keep your bandage dry during this time.      You do not need to put any medication (like Neosporin or Vitamin E) on the wound. Scrubbing the wound should be avoided until the staples nondissolvable sutures come out.     Heating pads have the potential to cause very serious burns, especially in patients using narcotic pain medications (e.g. Oxycodone, Oxycontin, etc.) Do not use heating pads during your recovery.      No nicotine  products, including second-hand smoke, gum or patches. Nicotine will delay healing and increase the likelihood of a surgical complication. For help quitting, call the Quitline: 1-261.900.8647     Potential wound problems include the following:  • Infection--If the wound becomes red, tender, swollen, or warm it may be infected. Infection is often accompanied by fever. If you think your wound might be infected you should call your doctor. Often you can send us a picture of the wound so we can better evaluate it.   • Drainage--Fluid should not drain from your wound. If it does, call your doctor. Colored fluid may indicate infection. Clear fluid may indicate leakage of spinal fluid.   • Dehiscence--If the wound does not heal properly it may open up along the staple line. This is called dehiscence. Call us immediately.   • Sutures--Occasionally, one of the buried threads (sutures) may work through the skin. If you think this has happened call your doctor.   • Swelling--Spinal fluid or blood may collect under the skin. This is usually harmless, but needs to be evaluated. Call your doctor.     How to contact your doctor  Dr. Pringle and his team did your surgery and, therefore, are likely to know more about your condition than any other physicians. We are immediately available to help you with any problems after surgery. Please call us for any concerns at the following numbers:  • Doctor’s office:  [phone] (answered 24 hours a day)   • [facility] : [phone] (alternative emergency number for on-call neurosurgeon)     Specific instructions related to your surgery  Diet: no restrictions, eat a heart healthy diet. If you are diabetic, tightly controlling your blood sugar over the next 2 weeks is crucial in controlling infection.     Activity: as tolerated, no heavy lifting or strenuous exercise for at least 2 weeks.    Brace/collar instructions: You have been provided a sling for your arms.   Please use this as you see fit for comfort.  It does not need to be worn.    Please use over the counter medicines like NSAIDs (Ibuprofen or naproxen sodium) for additional pain control. Tylenol/acetaminophen is acceptable as an over the counter pain management as well, but is included in percocet.  Any should not take more than 4000 mg a day.     Follow up:   Follow up with Dr. Pringle in the neurosurgery clinic ([phone]) in 2 weeks. We will schedule this appointment for you.            Sincerely,        Brad Pringle MD, PhD, MPH

## 2020-06-11 NOTE — OP NOTE
NEUROSURGERY OPERATIVE NOTE    Korey Ruelas  OR Date: 6/11/2020    Pre-op Diagnosis:   Bilateral carpal tunnel syndrome [G56.03]  Cubital tunnel syndrome on right [G56.21]    Post-op Diagnosis:     Post-Op Diagnosis Codes:     * Bilateral carpal tunnel syndrome [G56.03]     * Cubital tunnel syndrome on right [G56.21]          Surgeon(s):  Gregroio Pringle MD    Anesthesia: General    Staff:   Circulator: Oskar Bowden RN; Jose Rodrigues RN; Bryan Heart RN  Scrub Person: Radha Najera; Lucho Peterson    Procedure(s):  CARPAL TUNNEL RELEASE  CUBITAL TUNNEL RELEASE    INDICATIONS:  Korey Ruelas is a 69 y.o. male with bilateral carpal tunnel syndrome and cubital tunnel syndrome.  This was confirmed by both clinical examination and EMG nerve conduction studies.  He failed a trial of conservative therapy which included bracing.      We discussed the risk benefits and possible complications of the above-mentioned procedure which included bleeding, infection, nerve damage, failure to heal, possible need for reoperation, possible recurrence, or any associated risk of the anesthesia. He voiced understanding and agreed to proceed as planned.    DESCRIPTION OF PROCEDURE:   The patient was identified in the holding area and correct operative site was izabel.  IV access was obtained and preoperative antibiotics were begun.  He was then brought to the operating room and transferred to the operating table in supine position.  A WHO time-out was then performed with the signed consent being reviewed at which point I, nursing staff, and anesthesia staff all confirmed the correct identification and there is no contraindication to proceeding .    Carpal Tunnel  After adequate general anesthesia was obtained. The right upper extremity was prepped and draped in the usual sterile fashion with Chloraprep and Ioban. Planned skin incision was marked along the base of the patient's right palm in-line with the  ring finger. The right palm was then infiltrated with quarter percent Marcaine with epinephrine.  A 2 cm skin incision was then made and dissection was carried down with scalpel to the level of the palmar fascia which was sharply divided by the skin incision. Hemostasis was identified by  Bipolar electrocautery. Retractors were then placed to allow visualization of the distal extent of the transverse carpal ligament, and this was then divided longitudinally under direct vision. Tenotomy scissors were used to dissect distal to this area to confirm the absence of any remaining crossing obstructing fibrous band. Retractors were then replaced proximally to allow visualization of proximal extent of the transverse carpal ligament and the release was continued proximally until complete release was performed. This was confirmed by visually and passing a [place]field #4 freely into the wrist. The carpal canal was then inspected. The median nerve was flattened and dusky. No other abnormalities were noted. Wounds were then irrigated with normal saline with Bacitracin. Skin incision was then closed with interrupted 3-0 Vicryl suture followed by mattress 3-0 nylon suture.     Cubital Tunnel  The medial epicondyle and the olecranon tip were well palpated. The incision was initiated at equidistant between the olecranon and the medial epicondyle extending 3-4 cm proximally and 6 cm distally. The ulnar nerve was identified proximally. It was mobilized with a blunt and a sharp dissection proximally to the arcade of [place], which was released sharply. The roof of the cubital tunnel was then incised and the nerve was mobilized distally to its motor branches. The ulnar nerve was well-isolated before it entered the cubital tunnel. The arch of the flexor carpi ulnaris was well defined. The fascia was elevated from the nerve and both the FCU fascia and the Esparza fascia were divided protecting the nerve under direct visualization.  Distally, the dissection was carried between the 2 heads of the FCU. Decompression of the nerve was performed between the heads of the FCU. The muscular branches were well protected. The venous plexus proximally and distally were well protected. The nerve was well mobilized from the cubital tunnel preserving the small longitudinal vessels accompanying it. Once the in situ decompression of the ulnar nerve was performed proximally and distally, the elbow was flexed and extended. There was no evidence of any subluxation. Hemostasis was achieved.  The wound was thoroughly irrigated with 500 mL of antibiotic irrigation.  Subcutaneous layer was closed with 2-0 Vicryl and skin was approximated with 3-0 Vicryl and then a 4-0 Monocryl. A well-padded dressing was applied. The patient was then extubated and transferred to the recovery room in stable condition. There were no intraoperative complications noted. The patient tolerated the procedure very well. There were no intraoperative or immediate postoperative complications. All counts were reported as correct.     Estimated Blood Loss: minimal    Complications: None    Implants: * No implants in log *    Specimens:                None      Drains: * No LDAs found *

## 2020-06-11 NOTE — ANESTHESIA PREPROCEDURE EVALUATION
Anesthesia Evaluation     Patient summary reviewed   no history of anesthetic complications:  NPO Solid Status: > 8 hours  NPO Liquid Status: > 8 hours           Airway   Mallampati: I  TM distance: >3 FB  Neck ROM: full  No difficulty expected  Dental - normal exam     Pulmonary    (-) asthma, sleep apnea, not a smoker    ROS comment: Pulmonary nodule  Cardiovascular   Exercise tolerance: good (4-7 METS)    (+) hypertension,   (-) past MI, CAD, dysrhythmias, cardiac stents, hyperlipidemia    ROS comment: Echo:   ·Estimated EF = 60%.  ·Left ventricular systolic function is normal.  ·Small patent foramen ovale present with right to left shunting.  ·No vegetations, masses or thrombus    Neuro/Psych  (-) seizures, TIA, CVA  GI/Hepatic/Renal/Endo    (-) liver disease, no renal disease, diabetes    Musculoskeletal     Abdominal    Substance History      OB/GYN          Other                      Anesthesia Plan    ASA 2     general     intravenous induction     Anesthetic plan, all risks, benefits, and alternatives have been provided, discussed and informed consent has been obtained with: patient.

## 2020-06-11 NOTE — ANESTHESIA POSTPROCEDURE EVALUATION
Patient: Korey Ruelas    Procedure Summary     Date:  06/11/20 Room / Location:   [facility]  /  [facility]    Anesthesia Start:  0817 Anesthesia Stop:  0952    Procedures:       CARPAL TUNNEL RELEASE (Right Wrist)      CUBITAL TUNNEL RELEASE (Right Elbow) Diagnosis:       Bilateral carpal tunnel syndrome      Cubital tunnel syndrome on right      (Bilateral carpal tunnel syndrome [G56.03])      (Cubital tunnel syndrome on right [G56.21])    Surgeon:  Gregorio Pringle MD Provider:  Praveen Buck CRNA    Anesthesia Type:  general ASA Status:  2          Anesthesia Type: general    Vitals  Vitals Value Taken Time   /87 6/11/2020 10:10 AM   Temp 97.5 °F (36.4 °C) 6/11/2020 10:10 AM   Pulse 64 6/11/2020 10:14 AM   Resp 14 6/11/2020 10:10 AM   SpO2 97 % 6/11/2020 10:14 AM   Vitals shown include unvalidated device data.        Post Anesthesia Care and Evaluation    Patient location during evaluation: PHASE II  Patient participation: complete - patient participated  Level of consciousness: awake and awake and alert  Pain score: 0  Pain management: adequate  Airway patency: patent  Anesthetic complications: No anesthetic complications  PONV Status: none  Cardiovascular status: acceptable  Respiratory status: acceptable  Hydration status: acceptable    Comments: Patient discharged according to acceptable Johny score per RN assessment. See nursing records for further information.     Blood pressure 154/93, pulse 61, temperature 97.5 °F (36.4 °C), temperature source Temporal, resp. rate 16, SpO2 97 %.

## 2020-06-11 NOTE — DISCHARGE SUMMARY
Date of Discharge:  6/11/2020    Discharge Diagnosis:   Patient Active Problem List   Diagnosis   • Essential hypertension   • Night sweats   • Cough   • Fever chills   • Other infectious mononucleosis without complication   • Family history of early CAD   • Right lower quadrant abdominal pain   • Cervicalgia   • Bilateral arm pain   • Numbness and tingling in both hands   • Cervical spinal stenosis   • Bilateral carpal tunnel syndrome   • Cubital tunnel syndrome on right   • Bone lesion   • Overweight (BMI 25.0-29.9)   • Non-tobacco user     1. Bilateral carpal tunnel syndrome    2. Cubital tunnel syndrome on right        Presenting Problem/History of Present Illness  Bilateral carpal tunnel syndrome [G56.03]  Cubital tunnel syndrome on right [G56.21]   1. Bilateral carpal tunnel syndrome    2. Cubital tunnel syndrome on right        Hospital Course  Korey Ruelas is a 69 y.o. male hypertension, renal stones, shingles, mono exposure, and he is overweight.  He presents with a new problem of occasional neck discomfort, primarily bilateral upper extremity pain, numbness, tingling, and weakness; 5% neck, 95% upper extremities.  Physical exam findings of right  strength weakness, 4/5 right bicep weakness, positive Tinel's sign over the bilateral cubital fossae and median nerve, hyperreflexia to the left upper extremity, and an antalgic gait.  His imaging shows bone marrow and STIR signal change within the C7 vertebral body, no T2 signal change within the central cord, retrolisthesis of C5 over C6, multilevel degenerative changes resulting in thecal sac and left foraminal narrowing at C6-7 and central canal and bilateral foraminal stenosis at C5-6.  Imaging discussed and reviewed with patient and family.    Korey underwent an uneventful right cubital tunnel and carpal tunnel release.  He was extubated found to be at his neurological baseline.  He had improved sensation in his hand.  He was then discharged home  with a small amount of pain medication.  We will see him back in clinic in 2 weeks for removal of his dressing and evaluation for left carpal tunnel.    Procedures Performed  Procedure(s):  CARPAL TUNNEL RELEASE  CUBITAL TUNNEL RELEASE       Consults:   Consults     No orders found from 5/13/2020 to 6/12/2020.          Pertinent Test Results:   Imaging Results (Last 24 Hours)     ** No results found for the last 24 hours. **        Lab Results (last 24 hours)     ** No results found for the last 24 hours. **          Condition on Discharge: Stable    Vital Signs  Temp:  [97.1 °F (36.2 °C)] 97.1 °F (36.2 °C)  Heart Rate:  [65-68] 68  Resp:  [16-18] 18  BP: (148)/(88) 148/88    Physical Exam:   Physical Exam     Neurologic Exam    Discharge Disposition  Home or Self Care    Discharge Medications     Discharge Medications      New Medications      Instructions Start Date   oxyCODONE-acetaminophen 5-325 MG per tablet  Commonly known as:  Percocet   1 tablet, Oral, Every 6 Hours PRN         Continue These Medications      Instructions Start Date   acyclovir 400 MG tablet  Commonly known as:  ZOVIRAX   400 mg, Oral, 2 Times Daily, Take no more than 5 doses a day.       aspirin 81 MG EC tablet   81 mg, Oral, As Needed, On hold for procedure      cholecalciferol 25 MCG (1000 UT) tablet  Commonly known as:  VITAMIN D3   1,000 Units, Oral, Daily      dorzolamide 2 % ophthalmic solution  Commonly known as:  TRUSOPT   1 drop, 3 Times Daily      doxazosin 1 MG tablet  Commonly known as:  CARDURA   1 mg, Oral, Nightly      fluorometholone 0.1 % ophthalmic suspension  Commonly known as:  FML   1 drop, Right Eye, Weekly      Norvasc 2.5 MG tablet  Generic drug:  amLODIPine   2.5 tablets, Oral, Daily             Discharge Diet:  As tolerated    Activity at Discharge:  As tolerated but no heavy lifting with his right arm    Follow-up Appointments  No future appointments.      Test Results Pending at Discharge       Gregorio Salinas  MD [place]  06/11/20  09:56    Time: Discharge 10 min

## 2020-06-11 NOTE — ANESTHESIA PROCEDURE NOTES
Airway  Date/Time: 6/11/2020 8:22 AM    General Information and Staff    CRNA: Praveen Buck CRNA    Indications and Patient Condition  Indications for airway management: CNS depression    Preoxygenated: yes  Mask difficulty assessment: 0 - not attempted    Final Airway Details  Final airway type: supraglottic airway      Successful airway: unique and I-gel  Size 5    Number of attempts at approach: 1  Assessment: lips, teeth, and gum same as pre-op and atraumatic intubation    Additional Comments  Atraumatic Insertion

## 2020-06-25 ENCOUNTER — OFFICE VISIT (OUTPATIENT)
Dept: NEUROSURGERY | Facility: CLINIC | Age: 70
End: 2020-06-25

## 2020-06-25 VITALS — BODY MASS INDEX: 28.99 KG/M2 | HEIGHT: 72 IN | WEIGHT: 214 LBS

## 2020-06-25 DIAGNOSIS — R20.0 NUMBNESS AND TINGLING IN BOTH HANDS: ICD-10-CM

## 2020-06-25 DIAGNOSIS — Z78.9 NON-TOBACCO USER: ICD-10-CM

## 2020-06-25 DIAGNOSIS — Z98.890 S/P CARPAL TUNNEL RELEASE: Primary | ICD-10-CM

## 2020-06-25 DIAGNOSIS — R20.2 NUMBNESS AND TINGLING IN BOTH HANDS: ICD-10-CM

## 2020-06-25 DIAGNOSIS — M79.601 BILATERAL ARM PAIN: ICD-10-CM

## 2020-06-25 DIAGNOSIS — G56.03 BILATERAL CARPAL TUNNEL SYNDROME: ICD-10-CM

## 2020-06-25 DIAGNOSIS — M79.602 BILATERAL ARM PAIN: ICD-10-CM

## 2020-06-25 DIAGNOSIS — M48.02 CERVICAL SPINAL STENOSIS: ICD-10-CM

## 2020-06-25 PROCEDURE — 99024 POSTOP FOLLOW-UP VISIT: CPT | Performed by: NURSE PRACTITIONER

## 2020-06-25 NOTE — PATIENT INSTRUCTIONS
"DASH Eating Plan  DASH stands for \"Dietary Approaches to Stop Hypertension.\" The DASH eating plan is a healthy eating plan that has been shown to reduce high blood pressure (hypertension). It may also reduce your risk for type 2 diabetes, heart disease, and stroke. The DASH eating plan may also help with weight loss.  What are tips for following this plan?    General guidelines  · Avoid eating more than 2,300 mg (milligrams) of salt (sodium) a day. If you have hypertension, you may need to reduce your sodium intake to 1,500 mg a day.  · Limit alcohol intake to no more than 1 drink a day for nonpregnant women and 2 drinks a day for men. One drink equals 12 oz of beer, 5 oz of wine, or 1½ oz of hard liquor.  · Work with your health care provider to maintain a healthy body weight or to lose weight. Ask what an ideal weight is for you.  · Get at least 30 minutes of exercise that causes your heart to beat faster (aerobic exercise) most days of the week. Activities may include walking, swimming, or biking.  · Work with your health care provider or diet and nutrition specialist (dietitian) to adjust your eating plan to your individual calorie needs.  Reading food labels    · Check food labels for the amount of sodium per serving. Choose foods with less than 5 percent of the Daily Value of sodium. Generally, foods with less than 300 mg of sodium per serving fit into this eating plan.  · To find whole grains, look for the word \"whole\" as the first word in the ingredient list.  Shopping  · Buy products labeled as \"low-sodium\" or \"no salt added.\"  · Buy fresh foods. Avoid canned foods and premade or frozen meals.  Cooking  · Avoid adding salt when cooking. Use salt-free seasonings or herbs instead of table salt or sea salt. Check with your health care provider or pharmacist before using salt substitutes.  · Do not valle foods. Cook foods using healthy methods such as baking, boiling, grilling, and broiling instead.  · Cook with " heart-healthy oils, such as olive, canola, soybean, or sunflower oil.  Meal planning  · Eat a balanced diet that includes:  ? 5 or more servings of fruits and vegetables each day. At each meal, try to fill half of your plate with fruits and vegetables.  ? Up to 6-8 servings of whole grains each day.  ? Less than 6 oz of lean meat, poultry, or fish each day. A 3-oz serving of meat is about the same size as a deck of cards. One egg equals 1 oz.  ? 2 servings of low-fat dairy each day.  ? A serving of nuts, seeds, or beans 5 times each week.  ? Heart-healthy fats. Healthy fats called Omega-3 fatty acids are found in foods such as flaxseeds and coldwater fish, like sardines, salmon, and mackerel.  · Limit how much you eat of the following:  ? Canned or prepackaged foods.  ? Food that is high in trans fat, such as fried foods.  ? Food that is high in saturated fat, such as fatty meat.  ? Sweets, desserts, sugary drinks, and other foods with added sugar.  ? Full-fat dairy products.  · Do not salt foods before eating.  · Try to eat at least 2 vegetarian meals each week.  · Eat more home-cooked food and less restaurant, buffet, and fast food.  · When eating at a restaurant, ask that your food be prepared with less salt or no salt, if possible.  What foods are recommended?  The items listed may not be a complete list. Talk with your dietitian about what dietary choices are best for you.  Grains  Whole-grain or whole-wheat bread. Whole-grain or whole-wheat pasta. Brown rice. Oatmeal. Quinoa. Bulgur. Whole-grain and low-sodium cereals. Magui bread. Low-fat, low-sodium crackers. Whole-wheat flour tortillas.  Vegetables  Fresh or frozen vegetables (raw, steamed, roasted, or grilled). Low-sodium or reduced-sodium tomato and vegetable juice. Low-sodium or reduced-sodium tomato sauce and tomato paste. Low-sodium or reduced-sodium canned vegetables.  Fruits  All fresh, dried, or frozen fruit. Canned fruit in natural juice (without  added sugar).  Meat and other protein foods  Skinless chicken or turkey. Ground chicken or turkey. Pork with fat trimmed off. Fish and seafood. Egg whites. Dried beans, peas, or lentils. Unsalted nuts, nut butters, and seeds. Unsalted canned beans. Lean cuts of beef with fat trimmed off. Low-sodium, lean deli meat.  Dairy  Low-fat (1%) or fat-free (skim) milk. Fat-free, low-fat, or reduced-fat cheeses. Nonfat, low-sodium ricotta or cottage cheese. Low-fat or nonfat yogurt. Low-fat, low-sodium cheese.  Fats and oils  Soft margarine without trans fats. Vegetable oil. Low-fat, reduced-fat, or light mayonnaise and salad dressings (reduced-sodium). Canola, safflower, olive, soybean, and sunflower oils. Avocado.  Seasoning and other foods  Herbs. Spices. Seasoning mixes without salt. Unsalted popcorn and pretzels. Fat-free sweets.  What foods are not recommended?  The items listed may not be a complete list. Talk with your dietitian about what dietary choices are best for you.  Grains  Baked goods made with fat, such as croissants, muffins, or some breads. Dry pasta or rice meal packs.  Vegetables  Creamed or fried vegetables. Vegetables in a cheese sauce. Regular canned vegetables (not low-sodium or reduced-sodium). Regular canned tomato sauce and paste (not low-sodium or reduced-sodium). Regular tomato and vegetable juice (not low-sodium or reduced-sodium). Pickles. Olives.  Fruits  Canned fruit in a light or heavy syrup. Fried fruit. Fruit in cream or butter sauce.  Meat and other protein foods  Fatty cuts of meat. Ribs. Fried meat. Salmon. Sausage. Bologna and other processed lunch meats. Salami. Fatback. Hotdogs. Bratwurst. Salted nuts and seeds. Canned beans with added salt. Canned or smoked fish. Whole eggs or egg yolks. Chicken or turkey with skin.  Dairy  Whole or 2% milk, cream, and half-and-half. Whole or full-fat cream cheese. Whole-fat or sweetened yogurt. Full-fat cheese. Nondairy creamers. Whipped toppings.  Processed cheese and cheese spreads.  Fats and oils  Butter. Stick margarine. Lard. Shortening. Ghee. Salmon fat. Tropical oils, such as coconut, palm kernel, or palm oil.  Seasoning and other foods  Salted popcorn and pretzels. Onion salt, garlic salt, seasoned salt, table salt, and sea salt. Worcestershire sauce. Tartar sauce. Barbecue sauce. Teriyaki sauce. Soy sauce, including reduced-sodium. Steak sauce. Canned and packaged gravies. Fish sauce. Oyster sauce. Cocktail sauce. Horseradish that you find on the shelf. Ketchup. Mustard. Meat flavorings and tenderizers. Bouillon cubes. Hot sauce and Tabasco sauce. Premade or packaged marinades. Premade or packaged taco seasonings. Relishes. Regular salad dressings.  Where to find more information:  · National Heart, Lung, and Blood [place]: www.nhlbi.nih.gov  · American Heart Association: www.heart.org  Summary  · The DASH eating plan is a healthy eating plan that has been shown to reduce high blood pressure (hypertension). It may also reduce your risk for type 2 diabetes, heart disease, and stroke.  · With the DASH eating plan, you should limit salt (sodium) intake to 2,300 mg a day. If you have hypertension, you may need to reduce your sodium intake to 1,500 mg a day.  · When on the DASH eating plan, aim to eat more fresh fruits and vegetables, whole grains, lean proteins, low-fat dairy, and heart-healthy fats.  · Work with your health care provider or diet and nutrition specialist (dietitian) to adjust your eating plan to your individual calorie needs.  This information is not intended to replace advice given to you by your health care provider. Make sure you discuss any questions you have with your health care provider.  Document Released: 12/06/2012 Document Revised: 11/30/2018 Document Reviewed: 12/11/2017  Elsevier Patient Education © 2020 Elsevier Inc.      Smoking Tobacco Information, Adult  Smoking tobacco can be harmful to your health. Tobacco contains a  poisonous (toxic), colorless chemical called nicotine. Nicotine is addictive. It changes the brain and can make it hard to stop smoking. Tobacco also has other toxic chemicals that can hurt your body and raise your risk of many cancers.  How can smoking tobacco affect me?  Smoking tobacco puts you at risk for:  · Cancer. Smoking is most commonly associated with lung cancer, but can also lead to cancer in other parts of the body.  · Chronic obstructive pulmonary disease (COPD). This is a long-term lung condition that makes it hard to breathe. It also gets worse over time.  · High blood pressure (hypertension), heart disease, stroke, or heart attack.  · Lung infections, such as pneumonia.  · Cataracts. This is when the lenses in the eyes become clouded.  · Digestive problems. This may include peptic ulcers, heartburn, and gastroesophageal reflux disease (GERD).  · Oral health problems, such as gum disease and tooth loss.  · Loss of taste and smell.  Smoking can affect your appearance by causing:  · Wrinkles.  · Yellow or stained teeth, fingers, and fingernails.  Smoking tobacco can also affect your social life, because:  · It may be challenging to find places to smoke when away from home. Many workplaces, restaurants, hotels, and public places are tobacco-free.  · Smoking is expensive. This is due to the cost of tobacco and the long-term costs of treating health problems from smoking.  · Secondhand smoke may affect those around you. Secondhand smoke can cause lung cancer, breathing problems, and heart disease. Children of smokers have a higher risk for:  ? Sudden infant death syndrome (SIDS).  ? Ear infections.  ? Lung infections.  If you currently smoke tobacco, quitting now can help you:  · Lead a longer and healthier life.  · Look, smell, breathe, and feel better over time.  · Save money.  · Protect others from the harms of secondhand smoke.  What actions can I take to prevent health problems?  Quit smoking    · Do  not start smoking. Quit if you already do.  · Make a plan to quit smoking and commit to it. Look for programs to help you and ask your health care provider for recommendations and ideas.  · Set a date and write down all the reasons you want to quit.  · Let your friends and family know you are quitting so they can help and support you. Consider finding friends who also want to quit. It can be easier to quit with someone else, so that you can support each other.  · Talk with your health care provider about using nicotine replacement medicines to help you quit, such as gum, lozenges, patches, sprays, or pills.  · Do not replace cigarette smoking with electronic cigarettes, which are commonly called e-cigarettes. The safety of e-cigarettes is not known, and some may contain harmful chemicals.  · If you try to quit but return to smoking, stay positive. It is common to slip up when you first quit, so take it one day at a time.  · Be prepared for cravings. When you feel the urge to smoke, chew gum or suck on hard candy.  Lifestyle  · Stay busy and take care of your body.  · Drink enough fluid to keep your urine pale yellow.  · Get plenty of exercise and eat a healthy diet. This can help prevent weight gain after quitting.  · Monitor your eating habits. Quitting smoking can cause you to have a larger appetite than when you smoke.  · Find ways to relax. Go out with friends or family to a movie or a restaurant where people do not smoke.  · Ask your health care provider about having regular tests (screenings) to check for cancer. This may include blood tests, imaging tests, and other tests.  · Find ways to manage your stress, such as meditation, yoga, or exercise.  Where to find support  To get support to quit smoking, consider:  · Asking your health care provider for more information and resources.  · Taking classes to learn more about quitting smoking.  · Looking for local organizations that offer resources about quitting  smoking.  · Joining a support group for people who want to quit smoking in your local community.  · Calling the smokefree.gov counselor helpline: 1-800-Quit-Now (1-121.536.1399)  Where to find more information  You may find more information about quitting smoking from:  · HelpGuide.org: www.helpguide.org  · Smokefree.gov: smokefree.gov  · American Lung Association: www.lung.org  Contact a health care provider if you:  · Have problems breathing.  · Notice that your lips, nose, or fingers turn blue.  · Have chest pain.  · Are coughing up blood.  · Feel faint or you pass out.  · Have other health changes that cause you to worry.  Summary  · Smoking tobacco can negatively affect your health, the health of those around you, your finances, and your social life.  · Do not start smoking. Quit if you already do. If you need help quitting, ask your health care provider.  · Think about joining a support group for people who want to quit smoking in your local community. There are many effective programs that will help you to quit this behavior.  This information is not intended to replace advice given to you by your health care provider. Make sure you discuss any questions you have with your health care provider.  Document Released: 01/02/2018 Document Revised: 02/06/2019 Document Reviewed: 01/02/2018  Elsevier Patient Education © 2020 Elsevier Inc.      Advance Care Planning and Advance Directives     You make decisions on a daily basis - decisions about where you want to live, your career, your home, your life. Perhaps one of the most important decisions you face is your choice for future medical care. Take time to talk with your family and your healthcare team and start planning today.  Advance Care Planning is a process that can help you:  · Understand possible future healthcare decisions in light of your own experiences  · Reflect on those decision in light of your goals and values  · Discuss your decisions with those  closest to you and the healthcare professionals that care for you  · Make a plan by creating a document that reflects your wishes    Surrogate Decision Maker  In the event of a medical emergency, which has left you unable to communicate or to make your own decisions, you would need someone to make decisions for you.  It is important to discuss your preferences for medical treatment with this person while you are in good health.     Qualities of a surrogate decision maker:  • Willing to take on this role and responsibility  • Knows what you want for future medical care  • Willing to follow your wishes even if they don't agree with them  • Able to make difficult medical decisions under stressful circumstances    Advance Directives  These are legal documents you can create that will guide your healthcare team and decision maker(s) when needed. These documents can be stored in the electronic medical record.    · Living Will - a legal document to guide your care if you have a terminal condition or a serious illness and are unable to communicate. States vary by statute in document names/types, but most forms may include one or more of the following:        -  Directions regarding life-prolonging treatments        -  Directions regarding artificially provided nutrition/hydration        -  Choosing a healthcare decision maker        -  Direction regarding organ/tissue donation    · Durable Power of  for Healthcare - this document names an -in-fact to make medical decisions for you, but it may also allow this person to make personal and financial decisions for you. Please seek the advice of an  if you need this type of document.    **Advance Directives are not required and no one may discriminate against you if you do not sign one.    Medical Orders  Many states allow specific forms/orders signed by your physician to record your wishes for medical treatment in your current state of health. This form,  signed in personal communication with your physician, addresses resuscitation and other medical interventions that you may or may not want.      For more information or to schedule a time with a [facility] Advance Care Planning Facilitator contact: [facility] [place].[facility]/ACP or call [phone] and someone will contact you directly.

## 2020-06-25 NOTE — PROGRESS NOTES
Chief complaint:   Chief Complaint   Patient presents with   • Post-op     Patient is here today for a 2 week post-op right carpal tunnel release. Wound check.      Subjective     HPI:   Interval History: Korey Ruelas is a 69 y.o.  male who presents today for post operative follow-up from a Carpal Tunnel Release - Right and Cubital Tunnel Release - Right on 6/11/2020 per Dr. Pringle.  Mr. Ruelas has been well since we last saw him.  Compliant with postop bandaging to date.  He denies right upper extremity pain.  He continues to report numbness and paresthesias to all digits of the right hand, however improved postoperatively.  He denies fevers, chills, or concern for postoperative incision infection.  He currently rates his severity of his symptoms 0/10.  No additional concerns at this time    PFSH:  Past Medical History:   Diagnosis Date   • Abdominal mass    • Arthritis    • Carpal tunnel syndrome     right   • Congenital heart defect    • Cubital tunnel syndrome, right     right   • Hearing loss    • Hypertension    • Kidney stones    • Lung nodules    • Mononucleosis    • Shingles      Past Surgical History:   Procedure Laterality Date   • CARPAL TUNNEL RELEASE Right 6/11/2020    Procedure: CARPAL TUNNEL RELEASE;  Surgeon: Gregorio Pringle MD;  Location: [facility] OR;  Service: Neurosurgery;  Laterality: Right;   • CATARACT EXTRACTION     • COLONOSCOPY N/A 10/8/2019    Procedure: COLONOSCOPY WITH ANESTHESIA;  Surgeon: Contreras Inman DO;  Location: [facility] ENDOSCOPY;  Service: Gastroenterology   • KNEE SURGERY      1974   • ULNAR NERVE DECOMPRESSION Right 6/11/2020    Procedure: CUBITAL TUNNEL RELEASE;  Surgeon: Gregorio Pringle MD;  Location: [facility] OR;  Service: Neurosurgery;  Laterality: Right;     Objective      Current Outpatient Medications   Medication Sig Dispense Refill   • acyclovir (ZOVIRAX) 400 MG tablet Take 400 mg by mouth 2 (Two) Times a Day. Take no more than 5 doses a  "day.     • amLODIPine (Norvasc) 2.5 MG tablet Take 2.5 tablets by mouth Daily.     • aspirin 81 MG EC tablet Take 81 mg by mouth As Needed. On hold for procedure     • cholecalciferol (VITAMIN D3) 1000 units tablet Take 1,000 Units by mouth Daily.     • dorzolamide (TRUSOPT) 2 % ophthalmic solution 1 drop 3 (Three) Times a Day.     • doxazosin (CARDURA) 1 MG tablet Take 1 mg by mouth Every Night.     • fluorometholone (FML) 0.1 % ophthalmic suspension Administer 1 drop to the right eye 1 (One) Time Per Week.     • oxyCODONE-acetaminophen (Percocet) 5-325 MG per tablet Take 1 tablet by mouth Every 6 (Six) Hours As Needed for Moderate Pain . 30 tablet 0     No current facility-administered medications for this visit.      Vital Signs  Ht 183 cm (72.05\")   Wt 97.1 kg (214 lb)   BMI 28.98 kg/m²   Physical Exam   Constitutional: He is oriented to person, place, and time. Vital signs are normal. He appears well-developed and well-nourished. He is cooperative.  Non-toxic appearance. He does not have a sickly appearance. He does not appear ill. No distress.   BMI 29.0   HENT:   Head: Normocephalic and atraumatic.   Right Ear: Hearing normal.   Left Ear: Hearing normal.   Mouth/Throat: Mucous membranes are normal.   Eyes: Pupils are equal, round, and reactive to light. Conjunctivae and EOM are normal.   Neck: Trachea normal and full passive range of motion without pain. Neck supple.   Cardiovascular: Normal rate and regular rhythm.   Pulmonary/Chest: Effort normal. No accessory muscle usage. No apnea, no tachypnea and no bradypnea. No respiratory distress.   Abdominal: Soft. Normal appearance.   Neurological: He is alert and oriented to person, place, and time. Gait normal. GCS eye subscore is 4. GCS verbal subscore is 5. GCS motor subscore is 6.   Skin: Skin is warm, dry and intact. He is not diaphoretic.        Psychiatric: He has a normal mood and affect. His speech is normal and behavior is normal.   Nursing note and " vitals reviewed.    Neurologic Exam     Mental Status   Oriented to person, place, and time.   Attention: normal. Concentration: normal.   Speech: speech is normal   Level of consciousness: alert    Cranial Nerves     CN II   Visual fields full to confrontation.     CN III, IV, VI   Pupils are equal, round, and reactive to light.  Extraocular motions are normal.     CN V   Facial sensation intact.     CN VII   Facial expression full, symmetric.     CN VIII   CN VIII normal.     CN IX, X   CN IX normal.     CN XI   CN XI normal.     Motor Exam   Right arm tone: normal  Left arm tone: normal  Right arm pronator drift: absent  Left arm pronator drift: absent  Right leg tone: normal  Left leg tone: normal    Strength   Right deltoid: 5/5  Left deltoid: 5/5  Left biceps: 5/5  Left triceps: 5/5  Left wrist extension: 5/5  Right iliopsoas: 5/5  Left iliopsoas: 5/5  Right quadriceps: 5/5  Left quadriceps: 5/5  Right anterior tibial: 5/5  Left anterior tibial: 5/5  Right posterior tibial: 5/5  Left posterior tibial: 5/5    Sensory Exam   Right leg light touch: normal  Left leg light touch: normal    Gait, Coordination, and Reflexes     Gait  Gait: normal    Tremor   Resting tremor: absent  Intention tremor: absent  Action tremor: absent    Incision:   Scan on 6/25/2020 1533 by Gee Andrade, APRN: Postop ulnar   Scan on 6/25/2020 1534 by Gee Andrade, APRN: Postop carpal  (Consent to obtain photo of postoperative site for documentation purposes only obtained verbally by Mr. Ruelas)    Results Review: No new imaging      Assessment/Plan:   1. S/P carpal tunnel release    2. Bilateral carpal tunnel syndrome    3. Bilateral arm pain    4. Cervical spinal stenosis    5. Numbness and tingling in both hands    6. Non-tobacco user      Right ulnar neuropathy status post ulnar decompression  Right carpal tunnel status post status post carpal tunnel release  Mr. Ruelas has done well since we last saw him.  Mr. Ruelas presents today  for follow-up from a Carpal Tunnel Release - Right and Cubital Tunnel Release - Right on 6/11/2020 per Dr. Shane.  Prior reports of numbness, tingling, and weakness has resolved/ are improving.  Compliant with post-op bandaging.  Incision is clean, dry, and intact.  No drainage or discharge or signs of soft tissue infection.  4 sutures removed.  Mr. Ruelas tolerated this procedure well.  Keep area clean and dry.  Avoid overuse of right hand/ wrist and elbow.  Steady slow progression of use and daily ROM exercises recommended.  We will have Suraj return for reassessment with Dr. Shane in approximately 2 months.  Call or return for any additional concerns.     Overweight: 25.0-29.9kg/m2   Body mass index is 28.98 kg/m².  Information on the DASH diet provided in the AVS.  We will continue to provided diet and exercise information with the goal of weight loss at each scheduled appointment.     Korey was seen today for post-op.    Diagnoses and all orders for this visit:    S/P carpal tunnel release    Bilateral carpal tunnel syndrome    Bilateral arm pain    Cervical spinal stenosis    Numbness and tingling in both hands    Non-tobacco user      Return for KEEP SCHEDULED APT WITH DR. SHANE.    I discussed the patients findings and my recommendations with patient    MARYAM Llanos; 6/25/2020; 15:48

## 2020-07-16 ENCOUNTER — TELEPHONE (OUTPATIENT)
Dept: GASTROENTEROLOGY | Facility: CLINIC | Age: 70
End: 2020-07-16

## 2020-07-16 NOTE — TELEPHONE ENCOUNTER
I left a VM today about the fact that another c-scope to evaluate the noularity seems reasonable  I gave him my cell number for him to call me back

## 2020-07-28 ENCOUNTER — PREP FOR SURGERY (OUTPATIENT)
Dept: OTHER | Facility: HOSPITAL | Age: 70
End: 2020-07-28

## 2020-07-28 ENCOUNTER — TELEPHONE (OUTPATIENT)
Dept: GASTROENTEROLOGY | Facility: CLINIC | Age: 70
End: 2020-07-28

## 2020-07-28 DIAGNOSIS — K63.9 COLON ABNORMALITY: Primary | ICD-10-CM

## 2020-07-28 NOTE — TELEPHONE ENCOUNTER
Spoke with patient - schedule Colonoscopy for 08/06/2020 at 7:45am     [facility] to send to [facility].     Need case request.     Thank you

## 2020-07-29 PROBLEM — K63.9 COLON ABNORMALITY: Status: ACTIVE | Noted: 2020-07-29

## 2020-07-30 ENCOUNTER — TRANSCRIBE ORDERS (OUTPATIENT)
Dept: ADMINISTRATIVE | Facility: HOSPITAL | Age: 70
End: 2020-07-30

## 2020-07-30 DIAGNOSIS — Z01.818 PRE-OP TESTING: Primary | ICD-10-CM

## 2020-08-03 ENCOUNTER — LAB (OUTPATIENT)
Dept: LAB | Facility: HOSPITAL | Age: 70
End: 2020-08-03

## 2020-08-03 PROCEDURE — U0003 INFECTIOUS AGENT DETECTION BY NUCLEIC ACID (DNA OR RNA); SEVERE ACUTE RESPIRATORY SYNDROME CORONAVIRUS 2 (SARS-COV-2) (CORONAVIRUS DISEASE [COVID-19]), AMPLIFIED PROBE TECHNIQUE, MAKING USE OF HIGH THROUGHPUT TECHNOLOGIES AS DESCRIBED BY CMS-2020-01-R: HCPCS | Performed by: INTERNAL MEDICINE

## 2020-08-03 PROCEDURE — C9803 HOPD COVID-19 SPEC COLLECT: HCPCS | Performed by: INTERNAL MEDICINE

## 2020-08-04 LAB
COVID LABCORP PRIORITY: NORMAL
SARS-COV-2 RNA RESP QL NAA+PROBE: NOT DETECTED

## 2020-08-05 ENCOUNTER — OFFICE VISIT (OUTPATIENT)
Dept: NEUROSURGERY | Facility: CLINIC | Age: 70
End: 2020-08-05

## 2020-08-05 DIAGNOSIS — E66.3 OVERWEIGHT: ICD-10-CM

## 2020-08-05 DIAGNOSIS — G56.21 CUBITAL TUNNEL SYNDROME ON RIGHT: ICD-10-CM

## 2020-08-05 DIAGNOSIS — Z78.9 NON-TOBACCO USER: ICD-10-CM

## 2020-08-05 DIAGNOSIS — M47.12 CERVICAL SPONDYLOSIS WITH MYELOPATHY: Primary | ICD-10-CM

## 2020-08-05 DIAGNOSIS — G56.03 BILATERAL CARPAL TUNNEL SYNDROME: ICD-10-CM

## 2020-08-05 DIAGNOSIS — M48.02 CERVICAL SPINAL STENOSIS: ICD-10-CM

## 2020-08-05 PROCEDURE — 99024 POSTOP FOLLOW-UP VISIT: CPT | Performed by: NEUROLOGICAL SURGERY

## 2020-08-05 NOTE — PATIENT INSTRUCTIONS
"PATIENT TO CONTINUE TO FOLLOW UP WITH HIS/HER PRIMARY CARE PROVIDER FOR YEARLY PHYSICAL EXAMS TO ENSURE COMPLETE HEALTH MAINTENANCE      DASH Eating Plan  DASH stands for \"Dietary Approaches to Stop Hypertension.\" The DASH eating plan is a healthy eating plan that has been shown to reduce high blood pressure (hypertension). It may also reduce your risk for type 2 diabetes, heart disease, and stroke. The DASH eating plan may also help with weight loss.  What are tips for following this plan?    General guidelines  · Avoid eating more than 2,300 mg (milligrams) of salt (sodium) a day. If you have hypertension, you may need to reduce your sodium intake to 1,500 mg a day.  · Limit alcohol intake to no more than 1 drink a day for nonpregnant women and 2 drinks a day for men. One drink equals 12 oz of beer, 5 oz of wine, or 1½ oz of hard liquor.  · Work with your health care provider to maintain a healthy body weight or to lose weight. Ask what an ideal weight is for you.  · Get at least 30 minutes of exercise that causes your heart to beat faster (aerobic exercise) most days of the week. Activities may include walking, swimming, or biking.  · Work with your health care provider or diet and nutrition specialist (dietitian) to adjust your eating plan to your individual calorie needs.  Reading food labels    · Check food labels for the amount of sodium per serving. Choose foods with less than 5 percent of the Daily Value of sodium. Generally, foods with less than 300 mg of sodium per serving fit into this eating plan.  · To find whole grains, look for the word \"whole\" as the first word in the ingredient list.  Shopping  · Buy products labeled as \"low-sodium\" or \"no salt added.\"  · Buy fresh foods. Avoid canned foods and premade or frozen meals.  Cooking  · Avoid adding salt when cooking. Use salt-free seasonings or herbs instead of table salt or sea salt. Check with your health care provider or pharmacist before using salt " substitutes.  · Do not valle foods. Cook foods using healthy methods such as baking, boiling, grilling, and broiling instead.  · Cook with heart-healthy oils, such as olive, canola, soybean, or sunflower oil.  Meal planning  · Eat a balanced diet that includes:  ? 5 or more servings of fruits and vegetables each day. At each meal, try to fill half of your plate with fruits and vegetables.  ? Up to 6-8 servings of whole grains each day.  ? Less than 6 oz of lean meat, poultry, or fish each day. A 3-oz serving of meat is about the same size as a deck of cards. One egg equals 1 oz.  ? 2 servings of low-fat dairy each day.  ? A serving of nuts, seeds, or beans 5 times each week.  ? Heart-healthy fats. Healthy fats called Omega-3 fatty acids are found in foods such as flaxseeds and coldwater fish, like sardines, salmon, and mackerel.  · Limit how much you eat of the following:  ? Canned or prepackaged foods.  ? Food that is high in trans fat, such as fried foods.  ? Food that is high in saturated fat, such as fatty meat.  ? Sweets, desserts, sugary drinks, and other foods with added sugar.  ? Full-fat dairy products.  · Do not salt foods before eating.  · Try to eat at least 2 vegetarian meals each week.  · Eat more home-cooked food and less restaurant, buffet, and fast food.  · When eating at a restaurant, ask that your food be prepared with less salt or no salt, if possible.  What foods are recommended?  The items listed may not be a complete list. Talk with your dietitian about what dietary choices are best for you.  Grains  Whole-grain or whole-wheat bread. Whole-grain or whole-wheat pasta. Brown rice. Oatmeal. Quinoa. Bulgur. Whole-grain and low-sodium cereals. Magui bread. Low-fat, low-sodium crackers. Whole-wheat flour tortillas.  Vegetables  Fresh or frozen vegetables (raw, steamed, roasted, or grilled). Low-sodium or reduced-sodium tomato and vegetable juice. Low-sodium or reduced-sodium tomato sauce and tomato  paste. Low-sodium or reduced-sodium canned vegetables.  Fruits  All fresh, dried, or frozen fruit. Canned fruit in natural juice (without added sugar).  Meat and other protein foods  Skinless chicken or turkey. Ground chicken or turkey. Pork with fat trimmed off. Fish and seafood. Egg whites. Dried beans, peas, or lentils. Unsalted nuts, nut butters, and seeds. Unsalted canned beans. Lean cuts of beef with fat trimmed off. Low-sodium, lean deli meat.  Dairy  Low-fat (1%) or fat-free (skim) milk. Fat-free, low-fat, or reduced-fat cheeses. Nonfat, low-sodium ricotta or cottage cheese. Low-fat or nonfat yogurt. Low-fat, low-sodium cheese.  Fats and oils  Soft margarine without trans fats. Vegetable oil. Low-fat, reduced-fat, or light mayonnaise and salad dressings (reduced-sodium). Canola, safflower, olive, soybean, and sunflower oils. Avocado.  Seasoning and other foods  Herbs. Spices. Seasoning mixes without salt. Unsalted popcorn and pretzels. Fat-free sweets.  What foods are not recommended?  The items listed may not be a complete list. Talk with your dietitian about what dietary choices are best for you.  Grains  Baked goods made with fat, such as croissants, muffins, or some breads. Dry pasta or rice meal packs.  Vegetables  Creamed or fried vegetables. Vegetables in a cheese sauce. Regular canned vegetables (not low-sodium or reduced-sodium). Regular canned tomato sauce and paste (not low-sodium or reduced-sodium). Regular tomato and vegetable juice (not low-sodium or reduced-sodium). Pickles. Olives.  Fruits  Canned fruit in a light or heavy syrup. Fried fruit. Fruit in cream or butter sauce.  Meat and other protein foods  Fatty cuts of meat. Ribs. Fried meat. Salmon. Sausage. Bologna and other processed lunch meats. Salami. Fatback. Hotdogs. Bratwurst. Salted nuts and seeds. Canned beans with added salt. Canned or smoked fish. Whole eggs or egg yolks. Chicken or turkey with skin.  Dairy  Whole or 2% milk,  cream, and half-and-half. Whole or full-fat cream cheese. Whole-fat or sweetened yogurt. Full-fat cheese. Nondairy creamers. Whipped toppings. Processed cheese and cheese spreads.  Fats and oils  Butter. Stick margarine. Lard. Shortening. Ghee. Salmon fat. Tropical oils, such as coconut, palm kernel, or palm oil.  Seasoning and other foods  Salted popcorn and pretzels. Onion salt, garlic salt, seasoned salt, table salt, and sea salt. Worcestershire sauce. Tartar sauce. Barbecue sauce. Teriyaki sauce. Soy sauce, including reduced-sodium. Steak sauce. Canned and packaged gravies. Fish sauce. Oyster sauce. Cocktail sauce. Horseradish that you find on the shelf. Ketchup. Mustard. Meat flavorings and tenderizers. Bouillon cubes. Hot sauce and Tabasco sauce. Premade or packaged marinades. Premade or packaged taco seasonings. Relishes. Regular salad dressings.  Where to find more information:  · National Heart, Lung, and [employer] [place]: www.nhlbi.nih.gov  · American Heart Association: www.heart.org  Summary  · The DASH eating plan is a healthy eating plan that has been shown to reduce high blood pressure (hypertension). It may also reduce your risk for type 2 diabetes, heart disease, and stroke.  · With the DASH eating plan, you should limit salt (sodium) intake to 2,300 mg a day. If you have hypertension, you may need to reduce your sodium intake to 1,500 mg a day.  · When on the DASH eating plan, aim to eat more fresh fruits and vegetables, whole grains, lean proteins, low-fat dairy, and heart-healthy fats.  · Work with your health care provider or diet and nutrition specialist (dietitian) to adjust your eating plan to your individual calorie needs.  This information is not intended to replace advice given to you by your health care provider. Make sure you discuss any questions you have with your health care provider.  Document Released: 12/06/2012 Document Revised: 11/30/2018 Document Reviewed: 12/11/2017  Cb  Patient Education © 2020 [employer]

## 2020-08-05 NOTE — PROGRESS NOTES
Chief complaint:   Chief Complaint   Patient presents with   • Post-op     Underwent carpal tunnel and cubital tunnel release R arm on 06/11/20. Patient reports minimal improvement of symptoms.        Subjective     HPI:   Interval History: Korey returns today status post right carpal tunnel and cubital tunnel decompression on 6/11/2020.  Patient states that his incisions have healed well without complication.  He has had improvement in the sensation in the fourth and fifth digits.  However his first third digit still remain numb.  Patient has a known history of cervical stenosis at C5/6 and C6/7 asymmetric to the right.  We have previously discussed his anterior cervical surgery but he wanted to proceed with peripheral nerve decompression first.    Currently today he is complaining of 0 out of 10 pain.  He still has some difficulty with walking and states that he walks with an old man walk.  He still has numbness and tingling in the first through third digits.  His left hand is improved somewhat but his right hand is still difficult to feel.  He has no bowel or bladder incontinence.        Review of Systems   HENT: Negative.    Eyes: Negative.    Respiratory: Negative.    Cardiovascular: Negative.    Gastrointestinal: Negative.    Endocrine: Negative.    Genitourinary: Negative.    Musculoskeletal: Positive for back pain, gait problem, neck pain and neck stiffness.   Skin: Negative.    Allergic/Immunologic: Negative.    Neurological: Positive for weakness and numbness.   Hematological: Negative.    Psychiatric/Behavioral: Negative.        PFSH:  Past Medical History:   Diagnosis Date   • Abdominal mass    • Arthritis    • Carpal tunnel syndrome     right   • Congenital heart defect    • Cubital tunnel syndrome, right     right   • Hearing loss    • Hypertension    • Kidney stones    • Lung nodules    • Mononucleosis    • Shingles        Past Surgical History:   Procedure Laterality Date   • CARPAL TUNNEL RELEASE  Right 6/11/2020    Procedure: CARPAL TUNNEL RELEASE;  Surgeon: Gregorio Pringle MD;  Location: [facility] OR;  Service: Neurosurgery;  Laterality: Right;   • CATARACT EXTRACTION     • COLONOSCOPY N/A 10/8/2019    Procedure: COLONOSCOPY WITH ANESTHESIA;  Surgeon: Contreras Inman DO;  Location: [facility] ENDOSCOPY;  Service: Gastroenterology   • KNEE SURGERY      1974   • ULNAR NERVE DECOMPRESSION Right 6/11/2020    Procedure: CUBITAL TUNNEL RELEASE;  Surgeon: Gregorio Pringle MD;  Location: [facility] OR;  Service: Neurosurgery;  Laterality: Right;       Objective      Current Outpatient Medications   Medication Sig Dispense Refill   • acyclovir (ZOVIRAX) 400 MG tablet Take 400 mg by mouth 2 (Two) Times a Day. Take no more than 5 doses a day.     • amLODIPine (Norvasc) 2.5 MG tablet Take 2.5 tablets by mouth Daily.     • aspirin 81 MG EC tablet Take 81 mg by mouth As Needed. On hold for procedure     • cholecalciferol (VITAMIN D3) 1000 units tablet Take 1,000 Units by mouth Daily.     • dorzolamide (TRUSOPT) 2 % ophthalmic solution 1 drop 3 (Three) Times a Day.     • doxazosin (CARDURA) 1 MG tablet Take 1 mg by mouth Every Night.     • fluorometholone (FML) 0.1 % ophthalmic suspension Administer 1 drop to the right eye 1 (One) Time Per Week.       No current facility-administered medications for this visit.        Vital Signs  There were no vitals taken for this visit.  Physical Exam   Constitutional: He is oriented to person, place, and time.   Eyes: Pupils are equal, round, and reactive to light. EOM are normal.   Neurological: He is oriented to person, place, and time. Gait normal.   Reflex Scores:       Tricep reflexes are 3+ on the right side and 3+ on the left side.       Bicep reflexes are 3+ on the right side and 3+ on the left side.       Brachioradialis reflexes are 2+ on the right side and 2+ on the left side.       Patellar reflexes are 3+ on the right side and 3+ on the left side.       Achilles  reflexes are 3+ on the right side and 3+ on the left side.  Psychiatric: His speech is normal.     Neurologic Exam     Mental Status   Oriented to person, place, and time.   Speech: speech is normal     Cranial Nerves     CN II   Visual fields full to confrontation.     CN III, IV, VI   Pupils are equal, round, and reactive to light.  Extraocular motions are normal.     CN V   Right facial sensation deficit: none  Left facial sensation deficit: none    CN VII   Facial expression full, symmetric.     CN VIII   Hearing: intact    CN IX, X   Palate: symmetric    CN XI   Right sternocleidomastoid strength: normal  Left sternocleidomastoid strength: normal    CN XII   Tongue deviation: none    Motor Exam     Strength   Right deltoid: 5/5  Left deltoid: 5/5  Right biceps: 5/5  Left biceps: 5/5  Right triceps: 5/5  Left triceps: 5/5  Right interossei: 5/5  Left interossei: 5/5  Right iliopsoas: 5/5  Left iliopsoas: 5/5  Right quadriceps: 5/5  Left quadriceps: 5/5  Right anterior tibial: 5/5  Left anterior tibial: 5/5  Right gastroc: 5/5  Left gastroc: 5/5    Sensory Exam   Right arm light touch: normal  Left arm light touch: normal  Right leg light touch: normal  Left leg light touch: normal  Sensory deficit distribution on right: C5    Gait, Coordination, and Reflexes     Gait  Gait: normal    Reflexes   Right brachioradialis: 2+  Left brachioradialis: 2+  Right biceps: 3+  Left biceps: 3+  Right triceps: 3+  Left triceps: 3+  Right patellar: 3+  Left patellar: 3+  Right achilles: 3+  Left achilles: 3+  Right plantar: upgoing  Left plantar: upgoing  Right Obrien: absent  Left Obrien: absent    (12 bullet pts)    Male  strength (pounds)  AGE Right Hand RH Norms Left Hand LH Norms   20-24   121+20.6   104+21.8   25-29   120+23.0   110+16.2   30-34   121+22.4   110+21.7   35-39   119+24   113+21.7   40-44   117+20.7   112+18.7   45-49   110+23.0   101+22.8   50-54   113+18.1   102+17   55-59   101+26.7   83+23.4    60-64   90+20.4   77+20.3   65-69 57*,85 91+20.6 71,80 76.8+19.8   70-74   75+21.5   65+18.1   75+   66+21.0   55+17.0   (STEVE Moralez et al; Hand Dynometer: Effects of trials and sessions.  Perpetual and Motor Skills 61:195-8, 1985)     Imaging: (independent review and interpretation)  10/16/19 MRI of the cervical spine shows no acute fractures, bone marrow and STIR signal change within the C7 vertebral body, no T2 signal change within the central cord, retrolisthesis of C5 over C6, multilevel degenerative changes resulting in thecal sac and left foraminal narrowing at C6-7 and central canal and bilateral foraminal stenosis at C5-6.                           ASSESSMENT/ PLAN:  Korey Ruelas is a 69 y.o. male hypertension, renal stones, shingles, mono exposure, and he is overweight.  He presents with a new problem of occasional neck discomfort, primarily bilateral upper extremity pain, numbness, tingling, and weakness; 5% neck, 95% upper extremities.  Physical exam findings of right  strength weakness, 4/5 right bicep weakness, hyperreflexia to the left upper extremity and bilateral lower extremities, and bilateral Babinski's.  His MRI shows severe cervical stenosis at C5/6 and C6/7 with mild stenosis at C7/T1.  Disc herniations are asymmetric to the right.       TREATMENT RECOMMENDATIONS ...  Cervical Spondylitic Myelopathy  Cervical stenosis at C5/6 and C6/7  DDX: Cervical stenosis, facet arthropathy, brachial plexopathy, peripheral neuropathy     Natural history of cervical spondylitic myelopathy  1. In younger patients with mild CSM (age younger than 75 years and mJOA scale score > 12), both operative and nonoperative management options be offered, as objectively measurable deterioration in function is rarely seen acutely (quality of evidence: Class I; strength of recommendation, B).   2. Also the clinical gains after nonoperative treatment are often maintained over 3 years in 70% of cases (quality of  evidence, Class III; strength of recommendation, D).   3. The course of CSM is mixed, with many patients experiencing a slow, stepwise decline. We addressed the fact that long periods of quiescence are not uncommon and that a subgroup of patients may have interim improvement (quality of evidence, Class III; strength of recommendation, D).   4. However, long periods of severe stenosis over many years are associated with demyelination of white matter and may result in necrosis of both gray and white matter leading to potentially irreversible deficit (quality of evidence Class III; strength of recommendation, D).      Expected Functional Outcome After Surgery  1. Korey is 69 y.o. and his symptoms have been present for 3 years.  Korey's preoperative  [employer] [employer] Questionnaire (mJOA) is 15 and Oswestry Neck Disability Index (NDI) is 12.  These validated functional outcome measure will be trended postoperatively to better quantify his recovery (quality of evidence, Class II; strength of recommendation, B).   2. Recovery varies  a. The mean mJOA scores improve from 10.5 to 14.1  (Marie et al., 1993)  b. 59% of patients recovering significant neuro function.  (Marie et al., 1993) (Alexis et al., 2002)  3. Duration of symptoms affects recovery rate  a. Symptom duration correlates with neurological recovery rate. (Marie et al., 1993)  b. 73% of patients symptoms for <2 yrs improved postop outcomes, whereas 53% with symptoms >2 yrs had improvement. (Chagas et al., 2005)  c. In elderly patients, duration of symptoms (<1 yr) was predictive of an excellent neurological recovery. (Hallie et al., 2003)  d. Among elderly patients both symptom duration and severity of canal stenosis affected the neurological outcome. (Alexis et al., 2002)  4. Age affects recovery rate  a. 70% of patients <60 yrs old had an improved outcome score, whereas 56% of patients >60 yrs had an improved outcome score. (Chagas et al.,  2005)  b. Some studies show limited effect of age ... (Karolinaaki et al., 2003)  (Marie et al., 1993)  5. Symptom severity affects recovery  a. Among younger patients only symptom severity affected recovery. (Alexis et al., 2002)  6. The prognostic value of clinical factors such as age, duration of symptoms, and preoperative neurological function results were discussed with Korey (quality of evidence, Class III; strength of recommendation, D)     Radiographs  1. Preoperative T2 hyperintensity at multiple levels in the cervical cord predicts poor surgical outcome (quality of evidence, Class III; strength of recommendation, D).   2. Preoperative T1 hypointensity combined with T2 hyperintensity at the any single level predicts a poor surgical outcome (quality of evidence, Class III; strength of recommendation, D).   3. Spinal cord atrophy (transverse area < 45 mm2) may predict worse outcome (quality of evidence, Class III; strength of recommendation, D).      Surgical decision Making  1. In patients with cervical stenosis without myelopathy who have abnormal EMG findings, decompression should be considered because the presence of EMG abnormalities or clinical radiculopathy is associated with development of symptomatic CSM (quality of evidence, Class I; strength of recommendation, B).  2. MILD CSM (mJOA scale score >12) be treated in the short term (3 years) either with surgical decompression or with nonoperative therapy (prolonged immobilization in a stiff cervical collar, “low-risk” activity modification or bed rest, and antiinflammatory medications) based on patient preference (quality of evidence, Class II; strength of recommendation, C).      Korey  has addressed his peripheral neuropathy now would like to proceed with a two-level anterior cervical discectomy and fusion.  We discussed the risk of anterior cervical discectomy and fusion as well as progressive and adjacent segment disease.  He would like to proceed  with C5/6 and C6/7 ACDF.      I discussed the risks of the procedure, including but not limited to infection (less than 1%), bleeding, damage to local structures, perforation of the pharynx, esophageal and/or trachea.  Vocal cord paresis from damage to the recurrent laryngeal nerve (11% temporary, 4% permanent), vertebral artery injury due to thrombosis or laceration 0.3% incidence.  Carotid injury through thrombosis or occlusion or laceration.  Emanuel syndrome due to sympathetic plexus injury.  Thoracic duct injury.  Failure of fusion to to 20%, graft extrusion 2%, failure of hardware due to fracture, wound infection less than 1%, adjacent level degeneration (which is controversial)Injury to the nerves or thecal sac resulting in CSF leak, weakness, numbness, paralysis, or loss of bowel, and bladder function.  instrumentation failure, dysphagia, dysphonia, visual loss, stroke, coma, MI, or death.       Bilateral Carpal Tunnel Syndrome, Right>Left  Right Cubital Tunnel Syndrome, Mild   Korey has completed right cubital tunnel and carpal tunnel decompression.  He still has persistent numbness in the first through third digits on his right hand.  Based on the fact he is also hyperreflexic I believe this is primarily from his cervical spondylitic myelopathy.     Abnormal lesion in the C6 vertebral body   Known 11 mm lung nodule   Korey has an atypical mildly lytic lesion in the C7 vertebral body.  This does have a mildly sclerotic border.  It appears to respect anatomical landmarks.  A CT scan of the cervical spine was obtained for better characterization and did not show the typical pattern for hemangioma.  Given his history of having a lung nodule metastatic disease moves up in the differential.  Therefore I like to obtain a nuclear medicine study to evaluate the metabolic activity.  This can be done concomitantly with his carpal tunnel surgery.       Obesity counseling  BMI today is 26.3.  Information on the DASH  diet provided in the AVS.  We will continue to provided diet and exercise information with the goal of weight loss at each scheduled appointment.       1. Cervical spondylosis with myelopathy    2. Bilateral carpal tunnel syndrome    3. Cubital tunnel syndrome on right    4. Cervical spinal stenosis    5. Non-tobacco user    6. Overweight        Recommendations:  Korey was seen today for post-op.    Diagnoses and all orders for this visit:    Cervical spondylosis with myelopathy  -     Case Request; Standing  -     CBC & Differential; Future  -     Comprehensive Metabolic Panel; Future  -     Type & Screen; Future  -     ECG 12 Lead; Future  -     Case Request    Bilateral carpal tunnel syndrome    Cubital tunnel syndrome on right    Cervical spinal stenosis  -     Prealbumin; Future  -     Albumin; Future  -     Transferrin; Future  -     Comprehensive Metabolic Panel; Future  -     dexa bone density axial; Future  -     Vitamin D 25 Hydroxy; Future  -     Calcium; Future  -     PTH, Intact; Future  -     Ambulatory Referral to Family Practice  -     Orthotic Cervical Collar    Non-tobacco user    Overweight    Other orders  -     Follow Anesthesia Guidelines / Protocol; Future  -     Obtain Informed Consent; Future  -     Provide NPO Instructions to Patient; Future  -     Chlorhexidine Skin Prep; Future        Return for after surgery.    Level of Risk: High, Main OR  MDM: High Complexity  (Mod = 62537, High = 27493)    Thank you, for allowing me to continue to participate in the care of this patient.    Sincerely,  Gregorio Pringle MD

## 2020-08-06 ENCOUNTER — ANESTHESIA (OUTPATIENT)
Dept: GASTROENTEROLOGY | Facility: HOSPITAL | Age: 70
End: 2020-08-06

## 2020-08-06 ENCOUNTER — HOSPITAL ENCOUNTER (OUTPATIENT)
Facility: HOSPITAL | Age: 70
Setting detail: HOSPITAL OUTPATIENT SURGERY
Discharge: HOME | End: 2020-08-06
Attending: INTERNAL MEDICINE | Admitting: INTERNAL MEDICINE

## 2020-08-06 ENCOUNTER — ANESTHESIA EVENT (OUTPATIENT)
Dept: GASTROENTEROLOGY | Facility: HOSPITAL | Age: 70
End: 2020-08-06

## 2020-08-06 VITALS
TEMPERATURE: 97 F | WEIGHT: 210 LBS | SYSTOLIC BLOOD PRESSURE: 122 MMHG | HEIGHT: 74 IN | RESPIRATION RATE: 21 BRPM | OXYGEN SATURATION: 98 % | DIASTOLIC BLOOD PRESSURE: 84 MMHG | HEART RATE: 74 BPM | BODY MASS INDEX: 26.95 KG/M2

## 2020-08-06 DIAGNOSIS — K63.9 COLON ABNORMALITY: ICD-10-CM

## 2020-08-06 PROCEDURE — 25010000002 PROPOFOL 10 MG/ML EMULSION: Performed by: NURSE ANESTHETIST, CERTIFIED REGISTERED

## 2020-08-06 PROCEDURE — 88305 TISSUE EXAM BY PATHOLOGIST: CPT | Performed by: INTERNAL MEDICINE

## 2020-08-06 PROCEDURE — 45381 COLONOSCOPY SUBMUCOUS NJX: CPT | Performed by: INTERNAL MEDICINE

## 2020-08-06 PROCEDURE — 45380 COLONOSCOPY AND BIOPSY: CPT | Performed by: INTERNAL MEDICINE

## 2020-08-06 RX ORDER — PROPOFOL 10 MG/ML
VIAL (ML) INTRAVENOUS AS NEEDED
Status: DISCONTINUED | OUTPATIENT
Start: 2020-08-06 | End: 2020-08-06 | Stop reason: SURG

## 2020-08-06 RX ORDER — SODIUM CHLORIDE 0.9 % (FLUSH) 0.9 %
10 SYRINGE (ML) INJECTION AS NEEDED
Status: DISCONTINUED | OUTPATIENT
Start: 2020-08-06 | End: 2020-08-06 | Stop reason: HOSPADM

## 2020-08-06 RX ORDER — LIDOCAINE HYDROCHLORIDE 10 MG/ML
0.5 INJECTION, SOLUTION EPIDURAL; INFILTRATION; INTRACAUDAL; PERINEURAL ONCE AS NEEDED
Status: DISCONTINUED | OUTPATIENT
Start: 2020-08-06 | End: 2020-08-06 | Stop reason: HOSPADM

## 2020-08-06 RX ORDER — SODIUM CHLORIDE 9 MG/ML
500 INJECTION, SOLUTION INTRAVENOUS CONTINUOUS PRN
Status: DISCONTINUED | OUTPATIENT
Start: 2020-08-06 | End: 2020-08-06 | Stop reason: HOSPADM

## 2020-08-06 RX ADMIN — PROPOFOL 100 MG: 10 INJECTION, EMULSION INTRAVENOUS at 10:12

## 2020-08-06 RX ADMIN — PROPOFOL 100 MG: 10 INJECTION, EMULSION INTRAVENOUS at 10:29

## 2020-08-06 RX ADMIN — PROPOFOL 100 MG: 10 INJECTION, EMULSION INTRAVENOUS at 10:17

## 2020-08-06 RX ADMIN — PROPOFOL 100 MG: 10 INJECTION, EMULSION INTRAVENOUS at 10:21

## 2020-08-06 RX ADMIN — SODIUM CHLORIDE 500 ML: 9 INJECTION, SOLUTION INTRAVENOUS at 09:53

## 2020-08-06 RX ADMIN — PROPOFOL 100 MG: 10 INJECTION, EMULSION INTRAVENOUS at 10:25

## 2020-08-06 NOTE — H&P
[facility]  Pre Procedure History & Physical    Chief Complaint:   Colon mucosal abnormalities    Subjective     HPI:   Abn Colon    Past Medical History:   Past Medical History:   Diagnosis Date   • Abdominal mass    • Arthritis    • Carpal tunnel syndrome     right   • Congenital heart defect    • Cubital tunnel syndrome, right     right   • Hearing loss    • Hypertension    • Kidney stones    • Lung nodules    • Mononucleosis    • Shingles        Past Surgical History:  Past Surgical History:   Procedure Laterality Date   • CARPAL TUNNEL RELEASE Right 6/11/2020    Procedure: CARPAL TUNNEL RELEASE;  Surgeon: Gregorio Pringle MD;  Location: [facility] OR;  Service: Neurosurgery;  Laterality: Right;   • CATARACT EXTRACTION     • COLONOSCOPY N/A 10/8/2019    Procedure: COLONOSCOPY WITH ANESTHESIA;  Surgeon: Contreras Inman DO;  Location: [facility] ENDOSCOPY;  Service: Gastroenterology   • KNEE SURGERY      1974   • ULNAR NERVE DECOMPRESSION Right 6/11/2020    Procedure: CUBITAL TUNNEL RELEASE;  Surgeon: Gregorio Pringle MD;  Location: [facility] OR;  Service: Neurosurgery;  Laterality: Right;       Family History:  Family History   Problem Relation Age of Onset   • Heart disease Mother    • Heart disease Father    • Cirrhosis Sister        Social History:   reports that he has never smoked. He has never used smokeless tobacco. He reports that he drinks alcohol. He reports that he does not use drugs.    Medications:   Prior to Admission medications    Medication Sig Start Date End Date Taking? Authorizing Provider   acyclovir (ZOVIRAX) 400 MG tablet Take 400 mg by mouth 2 (Two) Times a Day. Take no more than 5 doses a day.   Yes Rigo Ferguson MD   amLODIPine (Norvasc) 2.5 MG tablet Take 2.5 tablets by mouth Daily. 6/8/20  Yes Rigo Ferguson MD   aspirin 81 MG EC tablet Take 81 mg by mouth As Needed. On hold for procedure    Rigo Ferguson MD   cholecalciferol (VITAMIN  "D3) 1000 units tablet Take 1,000 Units by mouth Daily.    Provider, MD Rigo   dorzolamide (TRUSOPT) 2 % ophthalmic solution 1 drop 3 (Three) Times a Day.    Provider, MD Rigo   doxazosin (CARDURA) 1 MG tablet Take 1 mg by mouth Every Night.    Provider, MD Rigo   fluorometholone (FML) 0.1 % ophthalmic suspension Administer 1 drop to the right eye 1 (One) Time Per Week.    Provider, MD Rigo       Allergies:  Patient has no known allergies.    ROS:    General: Weight stable  Resp: No SOA  Cardiovascular: No CP    Objective     Blood pressure 145/83, pulse 74, temperature 97 °F (36.1 °C), temperature source Temporal, resp. rate 20, height 188 cm (74\"), weight 95.3 kg (210 lb), SpO2 98 %.    Physical Exam   Constitutional: Pt is oriented to person, place, and in no distress.   HENT: Mouth/Throat: Oropharynx is clear.   Cardiovascular: Normal rate, regular rhythm.    Pulmonary/Chest: Effort normal. No respiratory distress. No  wheezes.   Abdominal: Soft. Non-distended.  Skin: Skin is warm and dry.   Psychiatric: Mood, memory, affect and judgment appear normal.     Assessment/Plan     Diagnosis:  Abn colon    Anticipated Surgical Procedure:  C-scope    The risks, benefits, and alternatives of this procedure have been discussed with the patient or the responsible party- the patient understands and agrees to proceed.        "

## 2020-08-06 NOTE — ANESTHESIA POSTPROCEDURE EVALUATION
Patient: Korey Ruelas    Procedure Summary     Date:  08/06/20 Room / Location:  [facility] ENDOSCOPY 2 / [facility] ENDOSCOPY    Anesthesia Start:  1010 Anesthesia Stop:  1036    Procedure:  COLONOSCOPY WITH ANESTHESIA (N/A ) Diagnosis:       Colon abnormality      (Colon abnormality [K63.9])    Surgeon:  Contreras Inman DO Provider:  Praveen Buck CRNA    Anesthesia Type:  MAC ASA Status:  3          Anesthesia Type: MAC    Vitals  Vitals Value Taken Time   /87 8/6/2020 10:45 AM   Temp     Pulse 68 8/6/2020 10:47 AM   Resp 22 8/6/2020 10:40 AM   SpO2 97 % 8/6/2020 10:47 AM   Vitals shown include unvalidated device data.        Post Anesthesia Care and Evaluation    Patient location during evaluation: PHASE II  Patient participation: complete - patient participated  Level of consciousness: awake and alert  Pain management: adequate  Airway patency: patent  Anesthetic complications: No anesthetic complications  PONV Status: none  Cardiovascular status: acceptable and stable  Respiratory status: acceptable and unassisted  Hydration status: acceptable

## 2020-08-06 NOTE — ANESTHESIA PREPROCEDURE EVALUATION
Anesthesia Evaluation     Patient summary reviewed and Nursing notes reviewed   no history of anesthetic complications:  NPO Solid Status: > 8 hours  NPO Liquid Status: > 2 hours           Airway   Mallampati: I  TM distance: >3 FB  Neck ROM: full  No difficulty expected  Dental      Pulmonary    (-) sleep apnea, not a smoker  Cardiovascular   Exercise tolerance: good (4-7 METS)    (+) hypertension, valvular problems/murmurs (PFO),     ROS comment: [place] 1/2019  · Estimated EF = 60%.  · Left ventricular systolic function is normal.  · Small patent foramen ovale present with right to left shunting.  · No vegetations, masses or thrombus  Normal RV size and function    Pt reports this was done to rule out endocarditis    Neuro/Psych  (-) seizures, TIA, CVA  GI/Hepatic/Renal/Endo    (+)   renal disease stones,   (-) liver disease, diabetes    Musculoskeletal     (+) neck pain,       ROS comment: Cervical spinal stenosis, pt reports Dr Pringle requests to minimize neck extension  Abdominal    Substance History      OB/GYN          Other   arthritis,                      Anesthesia Plan    ASA 3     MAC     intravenous induction     Anesthetic plan, all risks, benefits, and alternatives have been provided, discussed and informed consent has been obtained with: patient.

## 2020-08-07 PROBLEM — M47.12 CERVICAL SPONDYLOSIS WITH MYELOPATHY: Status: ACTIVE | Noted: 2020-08-07

## 2020-08-07 LAB
CYTO UR: NORMAL
LAB AP CASE REPORT: NORMAL
LAB AP CLINICAL INFORMATION: NORMAL
PATH REPORT.FINAL DX SPEC: NORMAL
PATH REPORT.GROSS SPEC: NORMAL

## 2020-08-11 NOTE — PROGRESS NOTES
Talked to him just now   Told him his colon bx were negative  He does not want to have surg just to have it  Ov in 6 months

## 2020-08-13 ENCOUNTER — APPOINTMENT (OUTPATIENT)
Dept: PREADMISSION TESTING | Facility: HOSPITAL | Age: 70
End: 2020-08-13

## 2020-08-13 ENCOUNTER — HOSPITAL ENCOUNTER (OUTPATIENT)
Dept: BONE DENSITY | Facility: HOSPITAL | Age: 70
Discharge: HOME | End: 2020-08-13
Admitting: NEUROLOGICAL SURGERY

## 2020-08-13 VITALS
HEIGHT: 72 IN | SYSTOLIC BLOOD PRESSURE: 154 MMHG | WEIGHT: 221.12 LBS | DIASTOLIC BLOOD PRESSURE: 82 MMHG | HEART RATE: 78 BPM | RESPIRATION RATE: 16 BRPM | BODY MASS INDEX: 29.95 KG/M2 | OXYGEN SATURATION: 98 %

## 2020-08-13 DIAGNOSIS — M48.02 CERVICAL SPINAL STENOSIS: ICD-10-CM

## 2020-08-13 DIAGNOSIS — M47.12 CERVICAL SPONDYLOSIS WITH MYELOPATHY: ICD-10-CM

## 2020-08-13 LAB
25(OH)D3 SERPL-MCNC: 48.4 NG/ML (ref 30–100)
ALBUMIN SERPL-MCNC: 4.4 G/DL (ref 3.5–5.2)
ALBUMIN/GLOB SERPL: 1.9 G/DL
ALP SERPL-CCNC: 65 U/L (ref 39–117)
ALT SERPL W P-5'-P-CCNC: 20 U/L (ref 1–41)
ANION GAP SERPL CALCULATED.3IONS-SCNC: 11 MMOL/L (ref 5–15)
AST SERPL-CCNC: 28 U/L (ref 1–40)
BASOPHILS # BLD AUTO: 0.02 10*3/MM3 (ref 0–0.2)
BASOPHILS NFR BLD AUTO: 0.5 % (ref 0–1.5)
BILIRUB SERPL-MCNC: 0.5 MG/DL (ref 0–1.2)
BUN SERPL-MCNC: 12 MG/DL (ref 8–23)
BUN/CREAT SERPL: 12.1 (ref 7–25)
CALCIUM SPEC-SCNC: 9.3 MG/DL (ref 8.6–10.5)
CHLORIDE SERPL-SCNC: 106 MMOL/L (ref 98–107)
CO2 SERPL-SCNC: 26 MMOL/L (ref 22–29)
CREAT SERPL-MCNC: 0.99 MG/DL (ref 0.76–1.27)
DEPRECATED RDW RBC AUTO: 45.3 FL (ref 37–54)
EOSINOPHIL # BLD AUTO: 0.08 10*3/MM3 (ref 0–0.4)
EOSINOPHIL NFR BLD AUTO: 2 % (ref 0.3–6.2)
ERYTHROCYTE [DISTWIDTH] IN BLOOD BY AUTOMATED COUNT: 13.3 % (ref 12.3–15.4)
GFR SERPL CREATININE-BSD FRML MDRD: 75 ML/MIN/1.73
GLOBULIN UR ELPH-MCNC: 2.3 GM/DL
GLUCOSE SERPL-MCNC: 121 MG/DL (ref 65–99)
HCT VFR BLD AUTO: 40.4 % (ref 37.5–51)
HGB BLD-MCNC: 13.7 G/DL (ref 13–17.7)
IMM GRANULOCYTES # BLD AUTO: 0.01 10*3/MM3 (ref 0–0.05)
IMM GRANULOCYTES NFR BLD AUTO: 0.3 % (ref 0–0.5)
LYMPHOCYTES # BLD AUTO: 0.66 10*3/MM3 (ref 0.7–3.1)
LYMPHOCYTES NFR BLD AUTO: 16.7 % (ref 19.6–45.3)
MCH RBC QN AUTO: 31.1 PG (ref 26.6–33)
MCHC RBC AUTO-ENTMCNC: 33.9 G/DL (ref 31.5–35.7)
MCV RBC AUTO: 91.8 FL (ref 79–97)
MONOCYTES # BLD AUTO: 0.35 10*3/MM3 (ref 0.1–0.9)
MONOCYTES NFR BLD AUTO: 8.8 % (ref 5–12)
NEUTROPHILS NFR BLD AUTO: 2.84 10*3/MM3 (ref 1.7–7)
NEUTROPHILS NFR BLD AUTO: 71.7 % (ref 42.7–76)
NRBC BLD AUTO-RTO: 0 /100 WBC (ref 0–0.2)
PLATELET # BLD AUTO: 246 10*3/MM3 (ref 140–450)
PMV BLD AUTO: 9.9 FL (ref 6–12)
POTASSIUM SERPL-SCNC: 4.1 MMOL/L (ref 3.5–5.2)
PREALB SERPL-MCNC: 28.3 MG/DL (ref 20–40)
PROT SERPL-MCNC: 6.7 G/DL (ref 6–8.5)
PTH-INTACT SERPL-MCNC: 50.8 PG/ML (ref 15–65)
RBC # BLD AUTO: 4.4 10*6/MM3 (ref 4.14–5.8)
SODIUM SERPL-SCNC: 143 MMOL/L (ref 136–145)
TRANSFERRIN SERPL-MCNC: 239 MG/DL (ref 200–360)
WBC # BLD AUTO: 3.96 10*3/MM3 (ref 3.4–10.8)

## 2020-08-13 PROCEDURE — 80053 COMPREHEN METABOLIC PANEL: CPT | Performed by: NEUROLOGICAL SURGERY

## 2020-08-13 PROCEDURE — 36415 COLL VENOUS BLD VENIPUNCTURE: CPT

## 2020-08-13 PROCEDURE — 93005 ELECTROCARDIOGRAM TRACING: CPT

## 2020-08-13 PROCEDURE — 82306 VITAMIN D 25 HYDROXY: CPT | Performed by: NEUROLOGICAL SURGERY

## 2020-08-13 PROCEDURE — 84134 ASSAY OF PREALBUMIN: CPT | Performed by: NEUROLOGICAL SURGERY

## 2020-08-13 PROCEDURE — 85025 COMPLETE CBC W/AUTO DIFF WBC: CPT | Performed by: NEUROLOGICAL SURGERY

## 2020-08-13 PROCEDURE — 84466 ASSAY OF TRANSFERRIN: CPT | Performed by: NEUROLOGICAL SURGERY

## 2020-08-13 PROCEDURE — 83970 ASSAY OF PARATHORMONE: CPT | Performed by: NEUROLOGICAL SURGERY

## 2020-08-13 PROCEDURE — 93010 ELECTROCARDIOGRAM REPORT: CPT | Performed by: INTERNAL MEDICINE

## 2020-08-13 PROCEDURE — 77080 DXA BONE DENSITY AXIAL: CPT

## 2020-08-13 NOTE — DISCHARGE INSTRUCTIONS
DAY OF SURGERY INSTRUCTIONS        YOUR SURGEON: ***Dr. Pringle    PROCEDURE: ***ACDF    DATE OF SURGERY: ***8/25/2020    ARRIVAL TIME: AS DIRECTED BY OFFICE    YOU MAY TAKE THE FOLLOWING MEDICATION(S) THE MORNING OF SURGERY WITH A SIP OF WATER: ***Norvasc      ALL OTHER HOME MEDICATION CHECK WITH YOUR PHYSICIAN      DO NOT TAKE ANY ERECTILE DYSFUNCTION MEDICATIONS (EX: CIALIS, VIAGRA) 24 HOURS PRIOR TO SURGERY                      MANAGING PAIN AFTER SURGERY    We know you are probably wondering what your pain will be like after surgery.  Following surgery it is unrealistic to expect you will not have pain.   Pain is how our bodies let us know that something is wrong or cautions us to be careful.  That said, our goal is to make your pain tolerable.    Methods we may use to treat your pain include (oral or IV medications, PCAs, epidurals, nerve blocks, etc.)   While some procedures require IV pain medications for a short time after surgery, transitioning to pain medications by mouth allows for better management of pain.   Your nurse will encourage you to take oral pain medications whenever possible.  IV medications work almost immediately, but only last a short while.  Taking medications by mouth allows for a more constant level of medication in your blood stream for a longer period of time.      Once your pain is out of control it is harder to get back under control.  It is important you are aware when your next dose of pain medication is due.  If you are admitted, your nurse may write the time of your next dose on the white board in your room to help you remember.      We are interested in your pain and encourage you to inform us about aggravating factors during your visit.   Many times a simple repositioning every few hours can make a big difference.    If your physician says it is okay, do not let your pain prevent you from getting out of bed. Be sure to call your nurse for assistance prior to getting up so  you do not fall.      Before surgery, please decide your tolerable pain goal.  These faces help describe the pain ratings we use on a 0-10 scale.   Be prepared to tell us your goal and whether or not you take pain or anxiety medications at home.          BEFORE YOU COME TO THE HOSPITAL  (Pre-op instructions)  • Do not eat, drink, smoke or chew gum after midnight the night before surgery.  This also includes no mints.  • Morning of surgery take only the medicines you have been instructed with a sip of water unless otherwise instructed  by your physician.  • Do not shave, wear makeup or dark nail polish.  • Remove all jewelry including rings.  • Leave anything you consider valuable at home.  • Leave your suitcase in the car until after your surgery.  • Bring the following with you if applicable:  o Picture ID and insurance, Medicare or Medicaid cards  o Co-pay/deductible required by insurance (cash, check, credit card)  o Copy of advance directive, living will or power-of- documents if not brought to PAT  o CPAP or BIPAP mask and tubing  o Relaxation aids ( book, magazine), etc.  o Hearing aids                        ON THE DAY OF SURGERY  · On the day of surgery check in at registration located at the main entrance of the hospital.   ? You will be registered and given a beeper with instructions where to wait in the main lobby.  ? When your beeper lights up and vibrates a member of the Outpatient Surgery staff will meet you at the double doors under the stair steps and escort you to your preoperative room.   · You may have cloth compression devices placed on your legs. These help to prevent blood clots and reduce swelling in your legs.  · An IV may be inserted into one of your veins.  · In the operating room, you may be given one or more of the following:  ? A medicine to help you relax (sedative).  ? A medicine to numb the area (local anesthetic).  ? A medicine to make you fall asleep (general  "anesthetic).  ? A medicine that is injected into an area of your body to numb everything below the injection site (regional anesthetic).  · Your surgical site will be marked or identified.  · You may be given an antibiotic through your IV to help prevent infection.  Contact a health care provider if you:  · Develop a fever of more than 100.4°F (38°C) or other feelings of illness during the 48 hours before your surgery.  · Have symptoms that get worse.  Have questions or concerns about your surgery    General Anesthesia/Surgery, Adult  General anesthesia is the use of medicines to make a person \"go to sleep\" (unconscious) for a medical procedure. General anesthesia must be used for certain procedures, and is often recommended for procedures that:  · Last a long time.  · Require you to be still or in an unusual position.  · Are major and can cause blood loss.  The medicines used for general anesthesia are called general anesthetics. As well as making you unconscious for a certain amount of time, these medicines:  · Prevent pain.  · Control your blood pressure.  · Relax your muscles.  Tell a health care provider about:  · Any allergies you have.  · All medicines you are taking, including vitamins, herbs, eye drops, creams, and over-the-counter medicines.  · Any problems you or family members have had with anesthetic medicines.  · Types of anesthetics you have had in the past.  · Any blood disorders you have.  · Any surgeries you have had.  · Any medical conditions you have.  · Any recent upper respiratory, chest, or ear infections.  · Any history of:  ? Heart or lung conditions, such as heart failure, sleep apnea, asthma, or chronic obstructive pulmonary disease (COPD).  ?  service.  ? Depression or anxiety.  · Any tobacco or drug use, including marijuana or alcohol use.  · Whether you are pregnant or may be pregnant.  What are the risks?  Generally, this is a safe procedure. However, problems may occur, " including:  · Allergic reaction.  · Lung and heart problems.  · Inhaling food or liquid from the stomach into the lungs (aspiration).  · Nerve injury.  · Air in the bloodstream, which can lead to stroke.  · Extreme agitation or confusion (delirium) when you wake up from the anesthetic.  · Waking up during your procedure and being unable to move. This is rare.  These problems are more likely to develop if you are having a major surgery or if you have an advanced or serious medical condition. You can prevent some of these complications by answering all of your health care provider's questions thoroughly and by following all instructions before your procedure.  General anesthesia can cause side effects, including:  · Nausea or vomiting.  · A sore throat from the breathing tube.  · Hoarseness.  · Wheezing or coughing.  · Shaking chills.  · Tiredness.  · Body aches.  · Anxiety.  · Sleepiness or drowsiness.  · Confusion or agitation.  RISKS AND COMPLICATIONS OF SURGERY  Your health care provider will discuss possible risks and complications with you before surgery. Common risks and complications include:    · Problems due to the use of anesthetics.  · Blood loss and replacement (does not apply to minor surgical procedures).  · Temporary increase in pain due to surgery.  · Uncorrected pain or problems that the surgery was meant to correct.  · Infection.  · New damage.    What happens before the procedure?    Medicines  Ask your health care provider about:  · Changing or stopping your regular medicines. This is especially important if you are taking diabetes medicines or blood thinners.  · Taking medicines such as aspirin and ibuprofen. These medicines can thin your blood. Do not take these medicines unless your health care provider tells you to take them.  · Taking over-the-counter medicines, vitamins, herbs, and supplements. Do not take these during the week before your procedure unless your health care provider approves  them.  General instructions  · Starting 3-6 weeks before the procedure, do not use any products that contain nicotine or tobacco, such as cigarettes and e-cigarettes. If you need help quitting, ask your health care provider.  · If you brush your teeth on the morning of the procedure, make sure to spit out all of the toothpaste.  · Tell your health care provider if you become ill or develop a cold, cough, or fever.  · If instructed by your health care provider, bring your sleep apnea device with you on the day of your surgery (if applicable).  · Ask your health care provider if you will be going home the same day, the following day, or after a longer hospital stay.  ? Plan to have someone take you home from the hospital or clinic.  ? Plan to have a responsible adult care for you for at least 24 hours after you leave the hospital or clinic. This is important.  What happens during the procedure?  · You will be given anesthetics through both of the following:  ? A mask placed over your nose and mouth.  ? An IV in one of your veins.  · You may receive a medicine to help you relax (sedative).  · After you are unconscious, a breathing tube may be inserted down your throat to help you breathe. This will be removed before you wake up.  · An anesthesia specialist will stay with you throughout your procedure. He or she will:  ? Keep you comfortable and safe by continuing to give you medicines and adjusting the amount of medicine that you get.  ? Monitor your blood pressure, pulse, and oxygen levels to make sure that the anesthetics do not cause any problems.  The procedure may vary among health care providers and hospitals.  What happens after the procedure?  · Your blood pressure, temperature, heart rate, breathing rate, and blood oxygen level will be monitored until the medicines you were given have worn off.  · You will wake up in a recovery area. You may wake up slowly.  · If you feel anxious or agitated, you may be given  medicine to help you calm down.  · If you will be going home the same day, your health care provider may check to make sure you can walk, drink, and urinate.  · Your health care provider will treat any pain or side effects you have before you go home.  · Do not drive for 24 hours if you were given a sedative.  Summary  · General anesthesia is used to keep you still and prevent pain during a procedure.  · It is important to tell your healthcare provider about your medical history and any surgeries you have had, and previous experience with anesthesia.  · Follow your healthcare provider’s instructions about when to stop eating, drinking, or taking certain medicines before your procedure.  · Plan to have someone take you home from the hospital or clinic.  This information is not intended to replace advice given to you by your health care provider. Make sure you discuss any questions you have with your health care provider.  Document Released: 03/26/2009 Document Revised: 08/03/2018 Document Reviewed: 08/03/2018  "[employer] Interactive Patient Education © 2019 "[employer] Inc.       Fall Prevention in Hospitals, Adult  As a hospital patient, your condition and the treatments you receive can increase your risk for falls. Some additional risk factors for falls in a hospital include:  · Being in an unfamiliar environment.  · Being on bed rest.  · Your surgery.  · Taking certain medicines.  · Your tubing requirements, such as intravenous (IV) therapy or catheters.  It is important that you learn how to decrease fall risks while at the hospital. Below are important tips that can help prevent falls.  SAFETY TIPS FOR PREVENTING FALLS  Talk about your risk of falling.  · Ask your health care provider why you are at risk for falling. Is it your medicine, illness, tubing placement, or something else?  · Make a plan with your health care provider to keep you safe from falls.  · Ask your health care provider or pharmacist about side  effects of your medicines. Some medicines can make you dizzy or affect your coordination.  Ask for help.  · Ask for help before getting out of bed. You may need to press your call button.  · Ask for assistance in getting safely to the toilet.  · Ask for a walker or cane to be put at your bedside. Ask that most of the side rails on your bed be placed up before your health care provider leaves the room.  · Ask family or friends to sit with you.  · Ask for things that are out of your reach, such as your glasses, hearing aids, telephone, bedside table, or call button.  Follow these tips to avoid falling:  · Stay lying or seated, rather than standing, while waiting for help.  · Wear rubber-soled slippers or shoes whenever you walk in the hospital.  · Avoid quick, sudden movements.  ¨ Change positions slowly.  ¨ Sit on the side of your bed before standing.  ¨ Stand up slowly and wait before you start to walk.  · Let your health care provider know if there is a spill on the floor.  · Pay careful attention to the medical equipment, electrical cords, and tubes around you.  · When you need help, use your call button by your bed or in the bathroom. Wait for one of your health care providers to help you.  · If you feel dizzy or unsure of your footing, return to bed and wait for assistance.  · Avoid being distracted by the TV, telephone, or another person in your room.  · Do not lean or support yourself on rolling objects, such as IV poles or bedside tables.     This information is not intended to replace advice given to you by your health care provider. Make sure you discuss any questions you have with your health care provider.     Document Released: 12/15/2001 Document Revised: 01/08/2016 Document Reviewed: 08/25/2013  [employer] Interactive Patient Education ©2016 [employer]       [facility]  CHG 4% Patient Instruction Sheet    Chlorhexidine Before Surgery  Chlorhexidine gluconate (CHG) is a germ-killing  (antiseptic) solution that is used to clean the skin. It gets rid of the bacteria that normally live on the skin. Cleaning your skin with CHG before surgery helps lower the risk for infection after surgery.    How to use CHG solution  · You will take 2 showers, one shower the night before surgery, the second shower the morning of surgery before coming to the hospital.  · Use CHG only as told by your health care provider, and follow the instructions on the label.  · Use CHG solution while taking a shower. Follow these steps when using CHG solution (unless your health care provider gives you different instructions):  1. Start the shower.  2. Use your normal soap and shampoo to wash your face and hair.  3. Turn off the shower or move out of the shower stream.  4. Pour the CHG onto a clean washcloth. Do not use any type of brush or rough-edged sponge.  5. Starting at your neck, lather your body down to your toes. Make sure you:  6. Pay special attention to the part of your body where you will be having surgery. Scrub this area for at least 1 minute.  7. Use the full amount of CHG as directed. Usually, this is one half bottle for each shower.  8. Do not use CHG on your head or face. If the solution gets into your ears or eyes, rinse them well with water.  9. Avoid your genital area.  10. Avoid any areas of skin that have broken skin, cuts, or scrapes.  11. Scrub your back and under your arms. Make sure to wash skin folds.  12. Let the lather sit on your skin for 1-2 minutes or as long as told by your health care  provider.  13. Thoroughly rinse your entire body in the shower. Make sure that all body creases and crevices are rinsed well.  14. Dry off with a clean towel. Do not put any substances on your body afterward, such as powder, lotion, or perfume.  15. Put on clean clothes or pajamas.  16. If it is the night before your surgery, sleep in clean sheets.    What are the risks?  Risks of using CHG include:  · A skin  reaction.  · Hearing loss, if CHG gets in your ears.  · Eye injury, if CHG gets in your eyes and is not rinsed out.  · The CHG product catching fire.  Make sure that you avoid smoking and flames after applying CHG to your skin.  Do not use CHG:  · If you have a chlorhexidine allergy or have previously reacted to chlorhexidine.  · On babies younger than 2 months of age.      On the day of surgery, when you are taken to your room in Outpatient Surgery you will be given a CHG prepackaged cloth to wipe the site for your surgery.  How to use CHG prepackaged cloths  · Follow the instructions on the label.  · Use the CHG cloth on clean, dry skin. Follow these steps when using a CHG cloth (unless your health care provider gives you different instructions):  1. Using the CHG cloth, vigorously scrub the part of your body where you will be having surgery. Scrub using a back-and-forth motion for 3 minutes. The area on your body should be completely wet with CHG when you are finished scrubbing.  2. Do not rinse. Discard the cloth and let the area air-dry for 1 minute. Do not put any substances on your body afterward, such as powder, lotion, or perfume.  Contact a health care provider if:  · Your skin gets irritated after scrubbing.  · You have questions about using your solution or cloth.  Get help right away if:  · Your eyes become very red or swollen.  · Your eyes itch badly.  · Your skin itches badly and is red or swollen.  · Your hearing changes.  · You have trouble seeing.  · You have swelling or tingling in your mouth or throat.  · You have trouble breathing.  · You swallow any chlorhexidine.  Summary  · Chlorhexidine gluconate (CHG) is a germ-killing (antiseptic) solution that is used to clean the skin. Cleaning your skin with CHG before surgery helps lower the risk for infection after surgery.  · You may be given CHG to use at home. It may be in a bottle or in a prepackaged cloth to use on your skin. Carefully follow  your health care provider's instructions and the instructions on the product label.  · Do not use CHG if you have a chlorhexidine allergy.  · Contact your health care provider if your skin gets irritated after scrubbing.  This information is not intended to replace advice given to you by your health care provider. Make sure you discuss any questions you have with your health care provider.  Document Released: 09/11/2013 Document Revised: 11/15/2018 Document Reviewed: 11/15/2018  [employer] Interactive Patient Education © 2019 [employer]

## 2020-08-18 ENCOUNTER — TRANSCRIBE ORDERS (OUTPATIENT)
Dept: ADMINISTRATIVE | Facility: HOSPITAL | Age: 70
End: 2020-08-18

## 2020-08-18 DIAGNOSIS — Z11.59 SCREENING FOR VIRAL DISEASE: Primary | ICD-10-CM

## 2020-08-22 ENCOUNTER — LAB (OUTPATIENT)
Dept: LAB | Facility: HOSPITAL | Age: 70
End: 2020-08-22

## 2020-08-22 PROCEDURE — C9803 HOPD COVID-19 SPEC COLLECT: HCPCS | Performed by: NEUROLOGICAL SURGERY

## 2020-08-22 PROCEDURE — U0003 INFECTIOUS AGENT DETECTION BY NUCLEIC ACID (DNA OR RNA); SEVERE ACUTE RESPIRATORY SYNDROME CORONAVIRUS 2 (SARS-COV-2) (CORONAVIRUS DISEASE [COVID-19]), AMPLIFIED PROBE TECHNIQUE, MAKING USE OF HIGH THROUGHPUT TECHNOLOGIES AS DESCRIBED BY CMS-2020-01-R: HCPCS | Performed by: NEUROLOGICAL SURGERY

## 2020-08-23 LAB
COVID LABCORP PRIORITY: NORMAL
SARS-COV-2 RNA RESP QL NAA+PROBE: NOT DETECTED

## 2020-08-24 ENCOUNTER — TELEPHONE (OUTPATIENT)
Dept: NEUROSURGERY | Facility: CLINIC | Age: 70
End: 2020-08-24

## 2020-08-24 NOTE — TELEPHONE ENCOUNTER
"PT HAS CALLED TO CANCEL HIS \"ACDF\" SURGERY WITH DR GONSALVES ON 8/25 (WE WERE IN PROCESS OF R/S TO 9/1) AND PT CALLED TO STATE AT THIS TIME HE WANTS TO CANCEL SURGERY.     PT HAS DISCUSSED SURGERY WITH HIS BROTHER WHO IS IN THE MEDICAL PROFESSION AND THEY HAVE DECIDED THE RISK FOR COVID IS HIGH AT THIS TIME AND THAT HE SHOULD WAIT UNTIL AFTER THE FIRST OF THE YEAR TO RESCHEDULE PROCEDURE. PT STATES THAT HE WILL CALL OUR OFFICE AND SET UP A NEW APPT TO DISCUSS SURGERY WHEN HE FEELS MORE COMFORTABLE WITH RESCHEDULING.     NOTIFIED [facility] FOR DR SHANE.     EMAILED [employer] OF CANCELLATION    CALLED [facility] AND CANCELLED.     CANCELLED WITH CAT/SURGERY SCHEDULER.   "

## 2020-08-28 NOTE — TELEPHONE ENCOUNTER
*Of note:* [facility] clearance from [facility], Sharonda Teixeira was obtained prior to patient cancelling surgery. This has been scanned into chart.

## 2020-11-20 ENCOUNTER — TRANSCRIBE ORDERS (OUTPATIENT)
Dept: ADMINISTRATIVE | Facility: HOSPITAL | Age: 70
End: 2020-11-20

## 2020-11-20 DIAGNOSIS — M85.661 OTHER CYST OF BONE, RIGHT LOWER LEG: Primary | ICD-10-CM

## 2020-11-20 DIAGNOSIS — Z01.818 PREOP TESTING: Primary | ICD-10-CM

## 2020-11-23 ENCOUNTER — LAB (OUTPATIENT)
Dept: LAB | Facility: HOSPITAL | Age: 70
End: 2020-11-23

## 2020-11-23 LAB — SARS-COV-2 N GENE RESP QL NAA+PROBE: NOT DETECTED

## 2020-11-23 PROCEDURE — 87635 SARS-COV-2 COVID-19 AMP PRB: CPT | Performed by: NURSE PRACTITIONER

## 2020-11-23 PROCEDURE — C9803 HOPD COVID-19 SPEC COLLECT: HCPCS | Performed by: NURSE PRACTITIONER

## 2020-11-25 ENCOUNTER — HOSPITAL ENCOUNTER (OUTPATIENT)
Dept: ULTRASOUND IMAGING | Facility: HOSPITAL | Age: 70
Discharge: HOME | End: 2020-11-25
Admitting: NURSE PRACTITIONER

## 2020-11-25 DIAGNOSIS — M85.661 OTHER CYST OF BONE, RIGHT LOWER LEG: ICD-10-CM

## 2020-11-25 PROCEDURE — 76882 US LMTD JT/FCL EVL NVASC XTR: CPT

## 2021-03-03 ENCOUNTER — TELEPHONE (OUTPATIENT)
Dept: PODIATRY | Facility: CLINIC | Age: 71
End: 2021-03-03

## 2021-03-03 NOTE — PROGRESS NOTES
[facility] - PODIATRY    Today's Date: 03/05/21    Patient Name: Korey Ruelas  MRN: [id number]  CSN: [id number]  PCP: Joe Mata MD  Referring Provider: System, Provider Not In    SUBJECTIVE     Chief Complaint   Patient presents with   • Establish Care     pt is here for right foot pain/would like left looked at as well- pt states this started around 3 months ago - pt pain level 6/10 while walking - pcp 06/25/20     HPI: Korey Ruelas, a 70 y.o.male, comes to clinic as a(n) new patient complaining of foot pain. Patient has h/o arthritis, carpal hesham syndrome, HTN, Renal stone, Lung nodule, Mono, shingles. Patient presents with complaints of bilateral foot pain with right foot causing more pain than left. Notes that for around the last 3 months foot has been increasingly painful along the plantar lateral aspect of the right foot. Notes that similar discomfort is starting to occur on the left foot. Notes that he rides bikes and is very active but has not had any change in activity level prior to onset of pain. Denies any known injury or trauma prior to onset of pain. Pain is worse when pressure is applied while walking. Admits pain at 6/10 level and described as stabbing, aching and sharp. Denies previous treatment. Denies any constitutional symptoms. No other pedal complaints at this time.    Past Medical History:   Diagnosis Date   • Abdominal mass    • Arthritis    • Carpal tunnel syndrome     right   • Congenital heart defect    • Cubital tunnel syndrome, right     right   • Hearing loss    • Hypertension    • Kidney stones    • Lung nodules    • Mononucleosis    • Shingles      Past Surgical History:   Procedure Laterality Date   • CARPAL TUNNEL RELEASE Right 6/11/2020    Procedure: CARPAL TUNNEL RELEASE;  Surgeon: Gregorio Pringle MD;  Location: [facility];  Service: Neurosurgery;  Laterality: Right;   • CATARACT EXTRACTION     • COLONOSCOPY N/A 10/8/2019     Procedure: COLONOSCOPY WITH ANESTHESIA;  Surgeon: Contreras Inman DO;  Location: [facility] ENDOSCOPY;  Service: Gastroenterology   • COLONOSCOPY N/A 8/6/2020    Procedure: COLONOSCOPY WITH ANESTHESIA;  Surgeon: Contreras Inman DO;  Location: [facility] ENDOSCOPY;  Service: Gastroenterology;  Laterality: N/A;  Pre:Abnormality of Colon  Post: Abnormality of Colon-Inked at 60cm and 30cm  Sharonda King MARYAM  CO2 Inflation Used   • KNEE SURGERY      1974   • ULNAR NERVE DECOMPRESSION Right 6/11/2020    Procedure: CUBITAL TUNNEL RELEASE;  Surgeon: Gregorio Pringle MD;  Location: [facility] OR;  Service: Neurosurgery;  Laterality: Right;     Family History   Problem Relation Age of Onset   • Heart disease Mother    • Heart disease Father    • Cirrhosis Sister      Social History     Socioeconomic History   • Marital status:      Spouse name: Not on file   • Number of children: Not on file   • Years of education: Not on file   • Highest education level: Not on file   Tobacco Use   • Smoking status: Never Smoker   • Smokeless tobacco: Never Used   Substance and Sexual Activity   • Alcohol use: Yes     Frequency: Never     Comment: rarely   • Drug use: No   • Sexual activity: Defer     No Known Allergies  Current Outpatient Medications   Medication Sig Dispense Refill   • acyclovir (ZOVIRAX) 400 MG tablet Take 400 mg by mouth 2 (Two) Times a Day. Take no more than 5 doses a day.     • amLODIPine (Norvasc) 2.5 MG tablet Take 5 mg by mouth Daily.     • cholecalciferol (VITAMIN D3) 1000 units tablet Take 1,000 Units by mouth Daily.     • dorzolamide (TRUSOPT) 2 % ophthalmic solution 1 drop 3 (Three) Times a Day.     • fluorometholone (FML) 0.1 % ophthalmic suspension Administer 1 drop to the right eye 1 (One) Time Per Week.       No current facility-administered medications for this visit.      Review of Systems   Constitutional: Negative for chills and fever.   HENT: Negative for congestion.    Respiratory: Negative for  shortness of breath.    Cardiovascular: Negative for chest pain and leg swelling.   Gastrointestinal: Negative for constipation, diarrhea, nausea and vomiting.   Musculoskeletal: Positive for arthralgias. Negative for myalgias.   Skin: Negative for wound.        Thickened skin to plantar lateral right foot   Neurological: Negative for numbness.       OBJECTIVE     Vitals:    03/05/21 1132   BP: 130/83   Pulse: 83   SpO2: 98%       PHYSICAL EXAM  GEN:   Accompanied by none  .     Foot/Ankle Exam:       General:   Appearance: appears stated age and healthy    Orientation: AAOx3    Affect: appropriate    Gait: unimpaired    Assistance: independent    Shoe Gear:  Casual shoes    VASCULAR      Right Foot Vascularity   Dorsalis pedis:  2+  Posterior tibial:  2+  Skin Temperature: warm    Edema Grading:  None  CFT:  3  Pedal Hair Growth:  Present  Varicosities: spider veins       Left Foot Vascularity   Dorsalis pedis:  2+  Posterior tibial:  2+  Skin Temperature: warm    Edema Grading:  None  CFT:  3  Pedal Hair Growth:  Present  Varicosities: spider veins        NEUROLOGIC     Right Foot Neurologic   Normal sensation    Light touch sensation:  Normal  Vibratory sensation:  Normal  Hot/Cold sensation: normal    Protective Sensation using [place] Monofilament:  10     Left Foot Neurologic   Normal sensation    Light touch sensation:  Normal  Vibratory sensation:  Normal  Hot/cold sensation: normal    Protective Sensation using [place] Monofilament:  10     MUSCULOSKELETAL      Right Foot Musculoskeletal   Ecchymosis:  None  Tenderness: right foot callus and 5th MTPJ    Arch:  Normal  Hallux valgus: No    Hallux limitus: No    Tailor's bunion: Yes       Left Foot Musculoskeletal   Ecchymosis:  None  Tenderness: 5th MTJP    Arch:  Normal  Hallux valgus: No    Hallux limitus: No    Tailor's bunion: Yes       MUSCLE STRENGTH     Right Foot Muscle Strength   Foot dorsiflexion:  5  Foot plantar flexion:   5  Foot inversion:  5  Foot eversion:  5     Left Foot Muscle Strength   Foot dorsiflexion:  5  Foot plantar flexion:  5  Foot inversion:  5  Foot eversion:  5     RANGE OF MOTION      Right Foot Range of Motion   Foot and ankle ROM within normal limits       Left Foot Range of Motion   Foot and ankle ROM within normal limits       DERMATOLOGIC     Right Foot Dermatologic   Skin: corn (IPK to sub 5th met head)    Nails: normal       Left Foot Dermatologic   Skin: corn ( mild to sub 5th met head)    Nails: normal       Image:       RADIOLOGY/NUCLEAR:  No results found.    LABORATORY/CULTURE RESULTS:      PATHOLOGY RESULTS:       ASSESSMENT/PLAN     Diagnoses and all orders for this visit:    1. Foot pain, bilateral (Primary)  -     XR Foot 3+ View Bilateral; Future    2. Tailor's bunion of both feet    3. Foot callus      Comprehensive lower extremity examination and evaluation was performed.  Discussed findings and treatment plan including risks, benefits, and treatment options with patient in detail. Patient agreed with treatment plan.  After verbal consent obtained, calluses x2 pared utilizing dermal curette and/or scalpel without incidence  Patient may maintain nails and calluses at home utilizing emery board or pumice stone between visits as needed  Findings most consistent with pain resulting from callus formation secondary to tailors bunion. Discussed treatment with patient including paring of lesions, home care of calluses, offloading of calluses with gel inserts with cutout, and also discussed other conservative measures including wide, well fitting shoes, and shields. Also discussed surgical intervention if necessary for failed conservative measures.   X-ray of bilateral feet to assess baseline and check for bony abnormalities.   2 tailors bunion shields dispensed.    An After Visit Summary was printed and given to the patient at discharge, including (if requested) any available informative/educational  handouts regarding diagnosis, treatment, or medications. All questions were answered to patient/family satisfaction. Should symptoms fail to improve or worsen they agree to call or return to clinic or to go to the Emergency Department. Discussed the importance of following up with any needed screening tests/labs/specialist appointments and any requested follow-up recommended by me today. Importance of maintaining follow-up discussed and patient accepts that missed appointments can delay diagnosis and potentially lead to worsening of conditions.  Return if symptoms worsen or fail to improve., or sooner if acute issues arise.    Lab Frequency Next Occurrence   Follow Anesthesia Guidelines / Protocol Once 05/08/2020   Obtain Informed Consent Once 05/13/2020   Provide NPO Instructions to Patient Once 05/13/2020   Chlorhexidine Skin Prep Once 05/13/2020   Type & Screen Once 05/08/2020   Follow Anesthesia Guidelines / Standing Orders Once 07/28/2020   Obtain Informed Consent Once 07/28/2020   Follow Anesthesia Guidelines / Protocol Once 08/05/2020   Obtain Informed Consent Once 08/10/2020   Provide NPO Instructions to Patient Once 08/10/2020   Chlorhexidine Skin Prep Once 08/10/2020   Diet: Once 08/06/2020       This document has been electronically signed by MARYAM Aviles on March 5, 2021 12:47 CST

## 2021-03-05 ENCOUNTER — HOSPITAL ENCOUNTER (OUTPATIENT)
Dept: GENERAL RADIOLOGY | Facility: HOSPITAL | Age: 71
Discharge: HOME | End: 2021-03-05
Admitting: NURSE PRACTITIONER

## 2021-03-05 ENCOUNTER — OFFICE VISIT (OUTPATIENT)
Dept: PODIATRY | Facility: CLINIC | Age: 71
End: 2021-03-05

## 2021-03-05 VITALS
HEART RATE: 83 BPM | DIASTOLIC BLOOD PRESSURE: 83 MMHG | OXYGEN SATURATION: 98 % | WEIGHT: 218 LBS | BODY MASS INDEX: 29.53 KG/M2 | HEIGHT: 72 IN | SYSTOLIC BLOOD PRESSURE: 130 MMHG

## 2021-03-05 DIAGNOSIS — M79.671 FOOT PAIN, BILATERAL: ICD-10-CM

## 2021-03-05 DIAGNOSIS — M79.672 FOOT PAIN, BILATERAL: Primary | ICD-10-CM

## 2021-03-05 DIAGNOSIS — M79.672 FOOT PAIN, BILATERAL: ICD-10-CM

## 2021-03-05 DIAGNOSIS — M79.671 FOOT PAIN, BILATERAL: Primary | ICD-10-CM

## 2021-03-05 DIAGNOSIS — M21.622 TAILOR'S BUNION OF BOTH FEET: ICD-10-CM

## 2021-03-05 DIAGNOSIS — M21.621 TAILOR'S BUNION OF BOTH FEET: ICD-10-CM

## 2021-03-05 DIAGNOSIS — L84 FOOT CALLUS: ICD-10-CM

## 2021-03-05 PROCEDURE — 73630 X-RAY EXAM OF FOOT: CPT

## 2021-03-05 PROCEDURE — 99214 OFFICE O/P EST MOD 30 MIN: CPT | Performed by: NURSE PRACTITIONER

## 2021-03-05 PROCEDURE — 11056 PARNG/CUTG B9 HYPRKR LES 2-4: CPT | Performed by: NURSE PRACTITIONER

## 2021-03-05 NOTE — PATIENT INSTRUCTIONS
Corns and Calluses  Corns are small areas of thickened skin that occur on the top, sides, or tip of a toe. They contain a cone-shaped core with a point that can press on a nerve below. This causes pain.   Calluses are areas of thickened skin that can occur anywhere on the body, including the hands, fingers, palms, soles of the feet, and heels. Calluses are usually larger than corns.  What are the causes?  Corns and calluses are caused by rubbing (friction) or pressure, such as from shoes that are too tight or do not fit properly.  What increases the risk?  Corns are more likely to develop in people who have misshapen toes (toe deformities), such as hammer toes.  Calluses can occur with friction to any area of the skin. They are more likely to develop in people who:  · Work with their hands.  · Wear shoes that fit poorly, are too tight, or are high-heeled.  · Have toe deformities.  What are the signs or symptoms?  Symptoms of a corn or callus include:  · A hard growth on the skin.  · Pain or tenderness under the skin.  · Redness and swelling.  · Increased discomfort while wearing tight-fitting shoes, if your feet are affected.  If a corn or callus becomes infected, symptoms may include:  · Redness and swelling that gets worse.  · Pain.  · Fluid, blood, or pus draining from the corn or callus.  How is this diagnosed?  Corns and calluses may be diagnosed based on your symptoms, your medical history, and a physical exam.  How is this treated?  Treatment for corns and calluses may include:  · Removing the cause of the friction or pressure. This may involve:  ? Changing your shoes.  ? Wearing shoe inserts (orthotics) or other protective layers in your shoes, such as a corn pad.  ? Wearing gloves.  · Applying medicine to the skin (topical medicine) to help soften skin in the hardened, thickened areas.  · Removing layers of dead skin with a file to reduce the size of the corn or callus.  · Removing the corn or callus with a  scalpel or laser.  · Taking antibiotic medicines, if your corn or callus is infected.  · Having surgery, if a toe deformity is the cause.  Follow these instructions at home:    · Take over-the-counter and prescription medicines only as told by your health care provider.  · If you were prescribed an antibiotic, take it as told by your health care provider. Do not stop taking it even if your condition starts to improve.  · Wear shoes that fit well. Avoid wearing high-heeled shoes and shoes that are too tight or too loose.  · Wear any padding, protective layers, gloves, or orthotics as told by your health care provider.  · Soak your hands or feet and then use a file or pumice stone to soften your corn or callus. Do this as told by your health care provider.  · Check your corn or callus every day for symptoms of infection.  Contact a health care provider if you:  · Notice that your symptoms do not improve with treatment.  · Have redness or swelling that gets worse.  · Notice that your corn or callus becomes painful.  · Have fluid, blood, or pus coming from your corn or callus.  · Have new symptoms.  Summary  · Corns are small areas of thickened skin that occur on the top, sides, or tip of a toe.  · Calluses are areas of thickened skin that can occur anywhere on the body, including the hands, fingers, palms, and soles of the feet. Calluses are usually larger than corns.  · Corns and calluses are caused by rubbing (friction) or pressure, such as from shoes that are too tight or do not fit properly.  · Treatment may include wearing any padding, protective layers, gloves, or orthotics as told by your health care provider.  This information is not intended to replace advice given to you by your health care provider. Make sure you discuss any questions you have with your health care provider.  Document Revised: 04/08/2020 Document Reviewed: 10/31/2018  [employer] Patient Education © 2020 [employer]    Jeremy    A jeremy is a  bump on the base of the big toe that forms when the bones of the big toe joint move out of position. Bunions may be small at first, but they often get larger over time. They can make walking painful.  What are the causes?  A bunion may be caused by:  · Wearing narrow or pointed shoes that force the big toe to press against the other toes.  · Abnormal foot development that causes the foot to roll inward (pronate).  · Changes in the foot that are caused by certain diseases, such as rheumatoid arthritis or polio.  · A foot injury.  What increases the risk?  The following factors may make you more likely to develop this condition:  · Wearing shoes that squeeze the toes together.  · Having certain diseases, such as:  ? Rheumatoid arthritis.  ? Polio.  ? Cerebral palsy.  · Having family members who have bunions.  · Being born with a foot deformity, such as flat feet or low arches.  · Doing activities that put a lot of pressure on the feet, such as ballet dancing.  What are the signs or symptoms?  The main symptom of a bunion is a noticeable bump on the big toe. Other symptoms may include:  · Pain.  · Swelling around the big toe.  · Redness and inflammation.  · Thick or hardened skin on the big toe or between the toes.  · Stiffness or loss of motion in the big toe.  · Trouble with walking.  How is this diagnosed?  A bunion may be diagnosed based on your symptoms, medical history, and activities. You may have tests, such as:  · X-rays. These allow your health care provider to check the position of the bones in your foot and look for damage to your joint. They also help your health care provider determine the severity of your bunion and the best way to treat it.  · Joint aspiration. In this test, a sample of fluid is removed from the toe joint. This test may be done if you are in a lot of pain. It helps rule out diseases that cause painful swelling of the joints, such as arthritis.  How is this treated?  Treatment depends on  the severity of your symptoms. The goal of treatment is to relieve symptoms and prevent the bunion from getting worse. Your health care provider may recommend:  · Wearing shoes that have a wide toe box.  · Using bunion pads to cushion the affected area.  · Taping your toes together to keep them in a normal position.  · Placing a device inside your shoe (orthotics) to help reduce pressure on your toe joint.  · Taking medicine to ease pain, inflammation, and swelling.  · Applying heat or ice to the affected area.  · Doing stretching exercises.  · Surgery to remove scar tissue and move the toes back into their normal position. This treatment is rare.  Follow these instructions at home:  Managing pain, stiffness, and swelling    · If directed, put ice on the painful area:  ? Put ice in a plastic bag.  ? Place a towel between your skin and the bag.  ? Leave the ice on for 20 minutes, 2-3 times a day.  Activity    · If directed, apply heat to the affected area before you exercise. Use the heat source that your health care provider recommends, such as a moist heat pack or a heating pad.  ? Place a towel between your skin and the heat source.  ? Leave the heat on for 20-30 minutes.  ? Remove the heat if your skin turns bright red. This is especially important if you are unable to feel pain, heat, or cold. You may have a greater risk of getting burned.  · Do exercises as told by your health care provider.  General instructions  · Support your toe joint with proper footwear, shoe padding, or taping as told by your health care provider.  · Take over-the-counter and prescription medicines only as told by your health care provider.  · Keep all follow-up visits as told by your health care provider. This is important.  Contact a health care provider if your symptoms:  · Get worse.  · Do not improve in 2 weeks.  Get help right away if you have:  · Severe pain and trouble with walking.  Summary  · A bunion is a bump on the base of  the big toe that forms when the bones of the big toe joint move out of position.  · Bunions can make walking painful.  · Treatment depends on the severity of your symptoms.  · Support your toe joint with proper footwear, shoe padding, or taping as told by your health care provider.  This information is not intended to replace advice given to you by your health care provider. Make sure you discuss any questions you have with your health care provider.  Document Revised: 06/24/2019 Document Reviewed: 04/30/2019  [employer] Patient Education © 2020 [employer]

## 2021-03-09 ENCOUNTER — TELEPHONE (OUTPATIENT)
Dept: PODIATRY | Facility: CLINIC | Age: 71
End: 2021-03-09

## 2021-05-03 ENCOUNTER — LAB (OUTPATIENT)
Dept: LAB | Facility: HOSPITAL | Age: 71
End: 2021-05-03

## 2021-05-03 DIAGNOSIS — R97.20 ELEVATED PROSTATE SPECIFIC ANTIGEN (PSA): ICD-10-CM

## 2021-05-03 LAB — PSA SERPL-MCNC: 4.18 NG/ML (ref 0–4)

## 2021-05-03 PROCEDURE — 36415 COLL VENOUS BLD VENIPUNCTURE: CPT

## 2021-05-03 PROCEDURE — 84153 ASSAY OF PSA TOTAL: CPT

## 2021-05-05 ENCOUNTER — OFFICE VISIT (OUTPATIENT)
Dept: UROLOGY | Facility: CLINIC | Age: 71
End: 2021-05-05

## 2021-05-05 VITALS — BODY MASS INDEX: 29.42 KG/M2 | HEIGHT: 73 IN | TEMPERATURE: 97.5 F | WEIGHT: 222 LBS

## 2021-05-05 DIAGNOSIS — R97.20 ELEVATED PROSTATE SPECIFIC ANTIGEN (PSA): Primary | ICD-10-CM

## 2021-05-05 DIAGNOSIS — N40.1 BPH WITH URINARY OBSTRUCTION: ICD-10-CM

## 2021-05-05 DIAGNOSIS — N13.8 BPH WITH URINARY OBSTRUCTION: ICD-10-CM

## 2021-05-05 LAB
BILIRUB BLD-MCNC: NEGATIVE MG/DL
CLARITY, POC: CLEAR
COLOR UR: YELLOW
GLUCOSE UR STRIP-MCNC: NEGATIVE MG/DL
KETONES UR QL: NEGATIVE
LEUKOCYTE EST, POC: NEGATIVE
NITRITE UR-MCNC: NEGATIVE MG/ML
PH UR: 6.5 [PH] (ref 5–8)
PROT UR STRIP-MCNC: NEGATIVE MG/DL
RBC # UR STRIP: NEGATIVE /UL
SP GR UR: 1.01 (ref 1–1.03)
UROBILINOGEN UR QL: NORMAL

## 2021-05-05 PROCEDURE — 99213 OFFICE O/P EST LOW 20 MIN: CPT | Performed by: UROLOGY

## 2021-05-05 PROCEDURE — 81003 URINALYSIS AUTO W/O SCOPE: CPT | Performed by: UROLOGY

## 2021-05-05 RX ORDER — LISINOPRIL 10 MG/1
10 TABLET ORAL DAILY
COMMUNITY

## 2021-06-21 ENCOUNTER — TELEPHONE (OUTPATIENT)
Dept: GASTROENTEROLOGY | Facility: CLINIC | Age: 71
End: 2021-06-21

## 2021-08-02 ENCOUNTER — TELEPHONE (OUTPATIENT)
Dept: PODIATRY | Facility: CLINIC | Age: 71
End: 2021-08-02

## 2021-08-02 NOTE — TELEPHONE ENCOUNTER
Per Abhishek last office note.  Patient would not need to return to the office unless it was needed.  Patient will need an new [facility] Authorization if he wishes to return to the office.

## 2021-09-09 ENCOUNTER — OFFICE VISIT (OUTPATIENT)
Dept: GASTROENTEROLOGY | Facility: CLINIC | Age: 71
End: 2021-09-09

## 2021-09-09 VITALS
TEMPERATURE: 97 F | SYSTOLIC BLOOD PRESSURE: 128 MMHG | OXYGEN SATURATION: 99 % | BODY MASS INDEX: 29.03 KG/M2 | WEIGHT: 219 LBS | HEART RATE: 65 BPM | DIASTOLIC BLOOD PRESSURE: 78 MMHG | HEIGHT: 73 IN

## 2021-09-09 DIAGNOSIS — K63.9 COLON ABNORMALITY: Primary | ICD-10-CM

## 2021-09-09 PROCEDURE — 99214 OFFICE O/P EST MOD 30 MIN: CPT | Performed by: INTERNAL MEDICINE

## 2021-09-09 RX ORDER — ROSUVASTATIN CALCIUM 10 MG/1
10 TABLET, COATED ORAL 3 TIMES WEEKLY
COMMUNITY

## 2021-09-09 RX ORDER — ALLOPURINOL 100 MG/1
100 TABLET ORAL DAILY
COMMUNITY

## 2021-09-09 NOTE — PROGRESS NOTES
Chief Complaint   Patient presents with   • Colonoscopy     3-2019 had colon polyps       PCP: Joe Mata MD  REFER: No ref. provider found    Subjective     HPI               Burning anal pain libertad after eating tomatoe/tomatoe sauce   Assoc noisy left sided pain over the last 2-3 months  Denies bright red blood per rectum/weight loss  Denies abdominal pain per se      Past Medical History:   Diagnosis Date   • Abdominal mass    • Arthritis    • Carpal tunnel syndrome     right   • Congenital heart defect    • Cubital tunnel syndrome, right     right   • Hearing loss    • Hypertension    • Kidney stones    • Lung nodules    • Mononucleosis    • Shingles        Past Surgical History:   Procedure Laterality Date   • CARPAL TUNNEL RELEASE Right 6/11/2020    Procedure: CARPAL TUNNEL RELEASE;  Surgeon: Gregorio Pringle MD;  Location: [facility] OR;  Service: Neurosurgery;  Laterality: Right;   • CATARACT EXTRACTION     • COLONOSCOPY N/A 10/8/2019    Procedure: COLONOSCOPY WITH ANESTHESIA;  Surgeon: Contreras Inman DO;  Location:  [facility] ENDOSCOPY;  Service: Gastroenterology   • COLONOSCOPY N/A 8/6/2020    Procedure: COLONOSCOPY WITH ANESTHESIA;  Surgeon: Contreras Inman DO;  Location: [facility] ENDOSCOPY;  Service: Gastroenterology;  Laterality: N/A;  Pre:Abnormality of Colon  Post: Abnormality of Colon-Inked at 60cm and 30cm  Sharonda FRANCISCO  CO2 Inflation Used   • KNEE SURGERY      1974   • ULNAR NERVE DECOMPRESSION Right 6/11/2020    Procedure: CUBITAL TUNNEL RELEASE;  Surgeon: Gregorio Pringle MD;  Location: [facility] OR;  Service: Neurosurgery;  Laterality: Right;   • VASECTOMY         Outpatient Medications Marked as Taking for the 9/9/21 encounter (Office Visit) with Contreras Inman DO   Medication Sig Dispense Refill   • acyclovir (ZOVIRAX) 400 MG tablet Take 400 mg by mouth 2 (Two) Times a Day. Take no more than 5 doses a day.     • allopurinol (ZYLOPRIM) 100 MG tablet Take 100 mg by  "mouth Daily.     • cholecalciferol (VITAMIN D3) 1000 units tablet Take 1,000 Units by mouth Daily.     • dorzolamide (TRUSOPT) 2 % ophthalmic solution 1 drop 3 (Three) Times a Day.     • fluorometholone (FML) 0.1 % ophthalmic suspension Administer 1 drop to the right eye 1 (One) Time Per Week.     • lisinopril (PRINIVIL,ZESTRIL) 10 MG tablet Take 10 mg by mouth Daily.     • rosuvastatin (CRESTOR) 10 MG tablet Take 10 mg by mouth 3 (Three) Times a Week.         No Known Allergies    Social History     Socioeconomic History   • Marital status:      Spouse name: Not on file   • Number of children: Not on file   • Years of education: Not on file   • Highest education level: Not on file   Tobacco Use   • Smoking status: Never Smoker   • Smokeless tobacco: Never Used   Vaping Use   • Vaping Use: Never used   Substance and Sexual Activity   • Alcohol use: Yes     Alcohol/week: 1.0 standard drinks     Types: 1 Cans of beer per week     Comment: nighty   • Drug use: No   • Sexual activity: Defer       Review of Systems   Constitutional: Negative for unexpected weight change.   Respiratory: Negative for shortness of breath.    Cardiovascular: Negative for chest pain.   Gastrointestinal: Negative for abdominal pain and anal bleeding.       Objective     Vitals:    09/09/21 1255   BP: 128/78   Pulse: 65   Temp: 97 °F (36.1 °C)   SpO2: 99%   Weight: 99.3 kg (219 lb)   Height: 185.4 cm (73\")     Body mass index is 28.89 kg/m².    Physical Exam  Constitutional:       Appearance: Normal appearance. He is well-developed.   Eyes:      General: No scleral icterus.  Neck:      Thyroid: No thyroid mass or thyromegaly.      Vascular: No JVD.   Pulmonary:      Effort: Pulmonary effort is normal. No accessory muscle usage.   Abdominal:      General: There is no distension.      Tenderness: There is no abdominal tenderness. There is no guarding.   Skin:     Coloration: Skin is not jaundiced.   Neurological:      Mental Status: He is " alert.   Psychiatric:         Behavior: Behavior is cooperative.         Judgment: Judgment normal.         Imaging Results (Most Recent)     None          Body mass index is 28.89 kg/m².    Assessment/Plan     Diagnoses and all orders for this visit:    1. Colon abnormality (Primary)    left sided colon nodules noted and reviewed  prob need to speak with his pcp and get him to a larger [facility] center for another (2nd opinion) c-scope/bx   Spoke to his PCP today   * Surgery not found *        Advised pt to stop use of NSAIDs, Fish Oil, and MV 5 days prior to procedure, per Dr Inman protocol.  Tylenol based products are ok to take.  Pt verbalized understanding.    Precautions are currently being put in place due to COVID-19.  I have explained to Korey Ruelas they will be required to undergo COVID testing prior to their procedure.  Korey Ruelas verbalized understanding and was willing to proceed.        Contreras Inman, DO  09/09/21          There are no Patient Instructions on file for this visit.

## 2021-11-04 ENCOUNTER — TELEPHONE (OUTPATIENT)
Dept: GASTROENTEROLOGY | Facility: CLINIC | Age: 71
End: 2021-11-04

## 2021-11-05 NOTE — TELEPHONE ENCOUNTER
Evaluated at [facility] by Dr Almas Hope  (nodularity on previous colonoscopy)    10/29/21    Recommendation to follow up in Jan 2022    If blood continues they will consider repeat colonoscopy at that time      Note is in Care Everywhere

## 2022-02-24 ENCOUNTER — TELEPHONE (OUTPATIENT)
Dept: GASTROENTEROLOGY | Facility: CLINIC | Age: 72
End: 2022-02-24

## 2022-05-24 ENCOUNTER — TELEPHONE (OUTPATIENT)
Dept: UROLOGY | Facility: CLINIC | Age: 72
End: 2022-05-24

## 2022-05-24 NOTE — TELEPHONE ENCOUNTER
I spoke to the patient and [facility] will [facility]. Once he sees them, he said that they will send an authorization for his appt with Dr. Alcantar.

## 2022-05-24 NOTE — TELEPHONE ENCOUNTER
----- Message from Vandana Mcfarland sent at 5/9/2022  4:51 PM CDT -----    ----- Message -----  From: Honey Goldsmith MA  Sent: 5/9/2022   9:39 AM CDT  To: [facility]    Pt needs one year follow up and a PSA prior

## 2023-06-09 ENCOUNTER — APPOINTMENT (OUTPATIENT)
Dept: CT IMAGING | Facility: HOSPITAL | Age: 73
End: 2023-06-09
Payer: OTHER GOVERNMENT

## 2023-06-09 ENCOUNTER — APPOINTMENT (OUTPATIENT)
Dept: GENERAL RADIOLOGY | Facility: HOSPITAL | Age: 73
End: 2023-06-09
Payer: OTHER GOVERNMENT

## 2023-06-09 ENCOUNTER — HOSPITAL ENCOUNTER (EMERGENCY)
Facility: HOSPITAL | Age: 73
Discharge: HOME | End: 2023-06-09
Attending: EMERGENCY MEDICINE
Payer: OTHER GOVERNMENT

## 2023-06-09 VITALS
OXYGEN SATURATION: 97 % | RESPIRATION RATE: 16 BRPM | HEIGHT: 73 IN | SYSTOLIC BLOOD PRESSURE: 131 MMHG | TEMPERATURE: 98.1 F | WEIGHT: 216 LBS | BODY MASS INDEX: 28.63 KG/M2 | HEART RATE: 66 BPM | DIASTOLIC BLOOD PRESSURE: 80 MMHG

## 2023-06-09 DIAGNOSIS — R07.9 CHEST PAIN, UNSPECIFIED TYPE: Primary | ICD-10-CM

## 2023-06-09 LAB
ALBUMIN SERPL-MCNC: 4.9 G/DL (ref 3.5–5.2)
ALBUMIN/GLOB SERPL: 1.8 G/DL
ALP SERPL-CCNC: 67 U/L (ref 39–117)
ALT SERPL W P-5'-P-CCNC: 41 U/L (ref 1–41)
ANION GAP SERPL CALCULATED.3IONS-SCNC: 14 MMOL/L (ref 5–15)
AST SERPL-CCNC: 39 U/L (ref 1–40)
BASOPHILS # BLD AUTO: 0.03 10*3/MM3 (ref 0–0.2)
BASOPHILS NFR BLD AUTO: 0.5 % (ref 0–1.5)
BILIRUB SERPL-MCNC: 0.7 MG/DL (ref 0–1.2)
BUN SERPL-MCNC: 20 MG/DL (ref 8–23)
BUN/CREAT SERPL: 16.9 (ref 7–25)
CALCIUM SPEC-SCNC: 10.3 MG/DL (ref 8.6–10.5)
CHLORIDE SERPL-SCNC: 102 MMOL/L (ref 98–107)
CO2 SERPL-SCNC: 24 MMOL/L (ref 22–29)
CREAT SERPL-MCNC: 1.18 MG/DL (ref 0.76–1.27)
D DIMER PPP FEU-MCNC: 0.78 MCGFEU/ML (ref 0–0.7)
DEPRECATED RDW RBC AUTO: 46.5 FL (ref 37–54)
EGFRCR SERPLBLD CKD-EPI 2021: 66.4 ML/MIN/1.73
EOSINOPHIL # BLD AUTO: 0.03 10*3/MM3 (ref 0–0.4)
EOSINOPHIL NFR BLD AUTO: 0.5 % (ref 0.3–6.2)
ERYTHROCYTE [DISTWIDTH] IN BLOOD BY AUTOMATED COUNT: 13.1 % (ref 12.3–15.4)
GEN 5 2HR TROPONIN T REFLEX: 11 NG/L
GLOBULIN UR ELPH-MCNC: 2.8 GM/DL
GLUCOSE SERPL-MCNC: 103 MG/DL (ref 65–99)
HCT VFR BLD AUTO: 44.9 % (ref 37.5–51)
HGB BLD-MCNC: 14.8 G/DL (ref 13–17.7)
HOLD SPECIMEN: NORMAL
HOLD SPECIMEN: NORMAL
IMM GRANULOCYTES # BLD AUTO: 0.02 10*3/MM3 (ref 0–0.05)
IMM GRANULOCYTES NFR BLD AUTO: 0.4 % (ref 0–0.5)
LYMPHOCYTES # BLD AUTO: 0.8 10*3/MM3 (ref 0.7–3.1)
LYMPHOCYTES NFR BLD AUTO: 14.5 % (ref 19.6–45.3)
MCH RBC QN AUTO: 31.5 PG (ref 26.6–33)
MCHC RBC AUTO-ENTMCNC: 33 G/DL (ref 31.5–35.7)
MCV RBC AUTO: 95.5 FL (ref 79–97)
MONOCYTES # BLD AUTO: 0.47 10*3/MM3 (ref 0.1–0.9)
MONOCYTES NFR BLD AUTO: 8.5 % (ref 5–12)
NEUTROPHILS NFR BLD AUTO: 4.15 10*3/MM3 (ref 1.7–7)
NEUTROPHILS NFR BLD AUTO: 75.6 % (ref 42.7–76)
NRBC BLD AUTO-RTO: 0 /100 WBC (ref 0–0.2)
NT-PROBNP SERPL-MCNC: 95.9 PG/ML (ref 0–900)
PLATELET # BLD AUTO: 245 10*3/MM3 (ref 140–450)
PMV BLD AUTO: 10.1 FL (ref 6–12)
POTASSIUM SERPL-SCNC: 4.3 MMOL/L (ref 3.5–5.2)
PROT SERPL-MCNC: 7.7 G/DL (ref 6–8.5)
RBC # BLD AUTO: 4.7 10*6/MM3 (ref 4.14–5.8)
SODIUM SERPL-SCNC: 140 MMOL/L (ref 136–145)
TROPONIN T DELTA: 0 NG/L
TROPONIN T SERPL HS-MCNC: 11 NG/L
WBC NRBC COR # BLD: 5.5 10*3/MM3 (ref 3.4–10.8)
WHOLE BLOOD HOLD COAG: NORMAL
WHOLE BLOOD HOLD SPECIMEN: NORMAL

## 2023-06-09 PROCEDURE — 93005 ELECTROCARDIOGRAM TRACING: CPT | Performed by: EMERGENCY MEDICINE

## 2023-06-09 PROCEDURE — 71045 X-RAY EXAM CHEST 1 VIEW: CPT

## 2023-06-09 PROCEDURE — 25510000001 IOPAMIDOL PER 1 ML: Performed by: EMERGENCY MEDICINE

## 2023-06-09 PROCEDURE — 84484 ASSAY OF TROPONIN QUANT: CPT | Performed by: EMERGENCY MEDICINE

## 2023-06-09 PROCEDURE — 85379 FIBRIN DEGRADATION QUANT: CPT | Performed by: EMERGENCY MEDICINE

## 2023-06-09 PROCEDURE — 99284 EMERGENCY DEPT VISIT MOD MDM: CPT

## 2023-06-09 PROCEDURE — 93010 ELECTROCARDIOGRAM REPORT: CPT | Performed by: INTERNAL MEDICINE

## 2023-06-09 PROCEDURE — 71275 CT ANGIOGRAPHY CHEST: CPT

## 2023-06-09 PROCEDURE — 36415 COLL VENOUS BLD VENIPUNCTURE: CPT

## 2023-06-09 PROCEDURE — 93005 ELECTROCARDIOGRAM TRACING: CPT

## 2023-06-09 PROCEDURE — 80053 COMPREHEN METABOLIC PANEL: CPT | Performed by: EMERGENCY MEDICINE

## 2023-06-09 PROCEDURE — 85025 COMPLETE CBC W/AUTO DIFF WBC: CPT | Performed by: EMERGENCY MEDICINE

## 2023-06-09 PROCEDURE — 83880 ASSAY OF NATRIURETIC PEPTIDE: CPT | Performed by: EMERGENCY MEDICINE

## 2023-06-09 RX ORDER — SODIUM CHLORIDE 0.9 % (FLUSH) 0.9 %
10 SYRINGE (ML) INJECTION AS NEEDED
Status: DISCONTINUED | OUTPATIENT
Start: 2023-06-09 | End: 2023-06-09 | Stop reason: HOSPADM

## 2023-06-09 RX ORDER — ASPIRIN 81 MG/1
324 TABLET, CHEWABLE ORAL ONCE
Status: DISCONTINUED | OUTPATIENT
Start: 2023-06-09 | End: 2023-06-09 | Stop reason: HOSPADM

## 2023-06-09 RX ADMIN — IOPAMIDOL 100 ML: 755 INJECTION, SOLUTION INTRAVENOUS at 17:25

## 2023-06-09 NOTE — ED PROVIDER NOTES
"Care of patient Korey Ruelas assumed from the previous healthcare provider.  See their note for further details.  Briefly, 72 y.o. male with PMH below presents with chest pain. Sensation of \"being hungry in his chest.\" CT chest pending.    Past Medical History:   Diagnosis Date    Abdominal mass     Arthritis     Carpal tunnel syndrome     right    Congenital heart defect     Cubital tunnel syndrome, right     right    Hearing loss     Hypertension     Kidney stones     Lung nodules     Mononucleosis     Shingles      Vitals:    06/09/23 1939   BP: 131/80   Pulse: 66   Resp: 16   Temp: 98.1 °F (36.7 °C)   SpO2: 97%         Plan at time of Handoff:   Pending CT chest.  HEART score 3       MDM/ED Course:  EKG: sinus rhythm, no focal ischemic changes, no arrhythmia.  CTA chest without PE  On my bedside discussion, I found the patient to have a heart score of 4 which may be due to a differentiation in how I consider his symptomatology.  He does have an abnormal description of his symptoms with a \"hunger cramp\" type of pain in his chest however he did have pain with exertion that resolved with rest and also made him slightly sweaty and nauseous.  No chest pain on my evaluation.    Given the negative troponin evaluation and HEARS score 4 with overall negative workup and no current chest pain, patient was offered admission vs discharge and opts for discharge with close outpatient follow-up.  I did discuss with the patient the elevated risk for major adverse cardiac event including MI, chest pain, other event, or death and the patient understands this risk an would still like to follow-up outpatient for stress test in the near future which is reasonable based on all the most recent research.  The patient agrees to follow-up as soon as possible and call their doctor about stress testing. Patient received strict return precautions including worsening chest pain, shortness of breath, lightheadedness, passing out, or other " concerns. All questions answered. Patient/family was understanding and in agreement with today's assessment and plan. The patient was monitored during their stay in the ED and dispositioned without acute event.    Electronically signed by:  Eh Vargas MD 6/9/2023 21:02 CDT  Note: Dragon medical dictation software was used in the creation of this note.       Eh Vargas MD  06/09/23 2591

## 2023-06-09 NOTE — ED PROVIDER NOTES
Subjective   History of Present Illness  Patient is 72 years old who came the ER complaint of chest discomfort.  The patient said he feels like how he does if he was hungry only that the symptoms are in his chest.  Been going on recently with more frequency.  Worse on exertion.  Family history of cardiac disease    Chest Pain  Pain location:  Substernal area  Pain quality: aching    Pain radiates to:  Does not radiate  Pain severity:  Mild  Onset quality:  Gradual  Timing:  Intermittent  Progression:  Waxing and waning  Chronicity:  New  Context: at rest    Context: not breathing, not drug use and not lifting    Worsened by:  Exertion  Ineffective treatments:  Rest  Associated symptoms: dizziness and shortness of breath    Associated symptoms: no abdominal pain, no AICD problem, no altered mental status, no anorexia, no back pain, no claudication, no cough, no fatigue, no fever, no headache, no heartburn, no lower extremity edema, no nausea, no near-syncope, no numbness, no orthopnea, no palpitations, no syncope, no vomiting and no weakness    Risk factors: male sex    Risk factors: no aortic disease, no birth control, no hypertension and no immobilization    Shortness of Breath  Severity:  Moderate  Onset quality:  Gradual  Timing:  Intermittent  Associated symptoms: chest pain    Associated symptoms: no abdominal pain, no claudication, no cough, no fever, no headaches, no neck pain, no syncope and no vomiting    Dizziness  Quality:  Lightheadedness  Severity:  Mild  Onset quality:  Gradual  Timing:  Intermittent  Chronicity:  New  Context: not when bending over, not with bowel movement, not with ear pain, not with inactivity, not with loss of consciousness, not with physical activity and not when standing up    Relieved by:  Nothing  Worsened by:  Nothing  Associated symptoms: chest pain and shortness of breath    Associated symptoms: no headaches, no nausea, no palpitations, no syncope, no vomiting and no  weakness    Risk factors: no anemia      Review of Systems   Constitutional: Negative.  Negative for fatigue and fever.   HENT: Negative.     Respiratory:  Positive for shortness of breath. Negative for cough.    Cardiovascular:  Positive for chest pain. Negative for palpitations, orthopnea, claudication, syncope and near-syncope.   Gastrointestinal: Negative.  Negative for abdominal distention, abdominal pain, anorexia, heartburn, nausea and vomiting.   Endocrine: Negative.    Genitourinary: Negative.    Musculoskeletal: Negative.  Negative for back pain and neck pain.   Skin:  Negative for color change and pallor.   Neurological:  Positive for dizziness. Negative for syncope, weakness, light-headedness, numbness and headaches.   Hematological: Negative.  Does not bruise/bleed easily.   All other systems reviewed and are negative.    Past Medical History:   Diagnosis Date    Abdominal mass     Arthritis     Carpal tunnel syndrome     right    Congenital heart defect     Cubital tunnel syndrome, right     right    Hearing loss     Hypertension     Kidney stones     Lung nodules     Mononucleosis     Shingles        No Known Allergies    Past Surgical History:   Procedure Laterality Date    CARPAL TUNNEL RELEASE Right 6/11/2020    Procedure: CARPAL TUNNEL RELEASE;  Surgeon: Gregorio Pringle MD;  Location: [facility] OR;  Service: Neurosurgery;  Laterality: Right;    CATARACT EXTRACTION      COLONOSCOPY N/A 10/8/2019    Procedure: COLONOSCOPY WITH ANESTHESIA;  Surgeon: Contreras Inman DO;  Location: [facility] ENDOSCOPY;  Service: Gastroenterology    COLONOSCOPY N/A 8/6/2020    Procedure: COLONOSCOPY WITH ANESTHESIA;  Surgeon: Contreras Inman DO;  Location: [facility] ENDOSCOPY;  Service: Gastroenterology;  Laterality: N/A;  Pre:Abnormality of Colon  Post: Abnormality of Colon-Inked at 60cm and 30cm  Sharonda FRANCISCO  CO2 Inflation Used    KNEE SURGERY      1974    ULNAR NERVE DECOMPRESSION Right 6/11/2020     Procedure: CUBITAL TUNNEL RELEASE;  Surgeon: Gregorio Pringle MD;  Location: [facility] OR;  Service: Neurosurgery;  Laterality: Right;    VASECTOMY         Family History   Problem Relation Age of Onset    Heart disease Mother     Heart disease Father     Cirrhosis Sister     Kidney disease Sister     Colon cancer Neg Hx     Colon polyps Neg Hx     Esophageal cancer Neg Hx        Social History     Socioeconomic History    Marital status:    Tobacco Use    Smoking status: Never    Smokeless tobacco: Never   Vaping Use    Vaping Use: Never used   Substance and Sexual Activity    Alcohol use: Yes     Alcohol/week: 1.0 standard drink     Types: 1 Cans of beer per week     Comment: nighty    Drug use: No    Sexual activity: Defer           Objective   Physical Exam  Vitals and nursing note reviewed. Exam conducted with a chaperone present.   Constitutional:       General: He is not in acute distress.     Appearance: Normal appearance. He is well-developed. He is not toxic-appearing.   HENT:      Head: Normocephalic and atraumatic.      Nose: Nose normal.      Mouth/Throat:      Mouth: Mucous membranes are moist.      Pharynx: Uvula midline.   Eyes:      General: Lids are normal. Lids are everted, no foreign bodies appreciated. No visual field deficit.     Conjunctiva/sclera: Conjunctivae normal.      Pupils: Pupils are equal, round, and reactive to light.   Neck:      Vascular: Normal carotid pulses. No carotid bruit or JVD.      Trachea: Trachea and phonation normal. No tracheal deviation.   Cardiovascular:      Rate and Rhythm: Normal rate and regular rhythm.      Chest Wall: PMI is not displaced.      Pulses: Normal pulses.      Heart sounds: Normal heart sounds.     No gallop.   Pulmonary:      Effort: Pulmonary effort is normal. No tachypnea, accessory muscle usage or respiratory distress.      Breath sounds: Normal breath sounds. No stridor. No decreased breath sounds, wheezing, rhonchi or rales.    Abdominal:      General: Bowel sounds are normal. There is no distension.      Palpations: Abdomen is soft.      Tenderness: There is no abdominal tenderness.   Musculoskeletal:         General: No swelling. Normal range of motion.      Cervical back: Full passive range of motion without pain, normal range of motion and neck supple. No rigidity.      Comments: Lower extremity exam bilaterally is unremarkable.  There is no right or left calf tenderness .  There is no palpable venous cord.  No obvious difference in the size of the legs.  No pitting edema.  The dorsalis pedis and posterior tibial femoral and popliteal pulses are palpable and +2 bilaterally.  Homans sign is negative   Skin:     General: Skin is warm and dry.      Capillary Refill: Capillary refill takes less than 2 seconds.      Coloration: Skin is not jaundiced or pale.      Nails: There is no clubbing.   Neurological:      General: No focal deficit present.      Mental Status: He is alert and oriented to person, place, and time.      GCS: GCS eye subscore is 4. GCS verbal subscore is 5. GCS motor subscore is 6.      Cranial Nerves: Cranial nerves 2-12 are intact. No cranial nerve deficit, dysarthria or facial asymmetry.      Motor: Motor function is intact.      Gait: Gait normal.      Deep Tendon Reflexes: Reflexes are normal and symmetric. Reflexes normal.      Comments: No nystagmus   Psychiatric:         Speech: Speech normal.         Behavior: Behavior normal.       Procedures           ED Course  ED Course as of 06/09/23 1736 Fri Jun 09, 2023   1433 · Estimated EF = 60%.  · Left ventricular systolic function is normal.  · Small patent foramen ovale present with right to left shunting.  · No vegetations, masses or thrombus   [TS]   1437 Normal sinus rhythm [TS]   1657 Heart score 3  Wells 3 [TS]   1735 Patient came with chest pain.  CT of the chest can be obtainedCardiac work-up is in progress second set is pendingCase turned over to   Sam [TS]      ED Course User Index  [TS] Taurus Watson MD                                           Medical Decision Making  Differential Diagnosis:  I considered chest wall pain, muscle strain, costochondritis, pleurisy, rib fracture, herpes zoster, cardiovascular etiology, myocardial infarction, intermediate coronary syndrome, unstable angina, angina, aortic dissection, pericarditis, pulmonary etiology, pulmonary embolism, pneumonia, pneumothorax, lung cancer, gastroesophageal reflux disease, esophagitis, esophageal spasm and gastrointestinal etiology as a possible cause of chest pain in this patient. This is a partial list of diagnoses considered.        Problems Addressed:  Chest pain, unspecified type: complicated acute illness or injury    Amount and/or Complexity of Data Reviewed  Labs: ordered.  Radiology: ordered.  ECG/medicine tests: ordered.    Risk  OTC drugs.  Prescription drug management.        Final diagnoses:   Chest pain, unspecified type       ED Disposition  ED Disposition       ED Disposition   Discharge    Condition   Stable    Comment   --               No follow-up provider specified.       Medication List      No changes were made to your prescriptions during this visit.            Taurus Watson MD  06/09/23 1437       Taurus Watson MD  06/09/23 4221

## 2023-06-10 NOTE — DISCHARGE INSTRUCTIONS
Please return if your chest pain severely worsens, if you become lightheaded or pass out, if you have severe shortness of breath, or for any other emergent concerns. Otherwise please follow-up for a stress test as soon as possible; [facility] or here. Your HEART sore was 4

## 2023-06-12 LAB
QT INTERVAL: 386 MS
QT INTERVAL: 428 MS
QTC INTERVAL: 437 MS
QTC INTERVAL: 440 MS

## 2023-07-25 ENCOUNTER — OFFICE VISIT (OUTPATIENT)
Dept: CARDIOLOGY | Facility: CLINIC | Age: 73
End: 2023-07-25
Payer: OTHER GOVERNMENT

## 2023-07-25 VITALS
WEIGHT: 213 LBS | SYSTOLIC BLOOD PRESSURE: 124 MMHG | HEIGHT: 73 IN | OXYGEN SATURATION: 99 % | DIASTOLIC BLOOD PRESSURE: 76 MMHG | HEART RATE: 77 BPM | BODY MASS INDEX: 28.23 KG/M2

## 2023-07-25 DIAGNOSIS — R20.0 NUMBNESS AND TINGLING IN BOTH HANDS: ICD-10-CM

## 2023-07-25 DIAGNOSIS — E66.3 OVERWEIGHT: ICD-10-CM

## 2023-07-25 DIAGNOSIS — Z82.49 FAMILY HISTORY OF EARLY CAD: ICD-10-CM

## 2023-07-25 DIAGNOSIS — R20.2 NUMBNESS AND TINGLING IN BOTH HANDS: ICD-10-CM

## 2023-07-25 DIAGNOSIS — I20.8 STABLE ANGINA PECTORIS: Primary | ICD-10-CM

## 2023-07-25 DIAGNOSIS — I10 ESSENTIAL HYPERTENSION: ICD-10-CM

## 2023-07-25 RX ORDER — ASPIRIN 81 MG/1
81 TABLET ORAL DAILY
Start: 2023-07-25

## 2023-07-25 NOTE — PROGRESS NOTES
"    [facility] - CARDIOLOGY  New Patient Initial Outpatient Evaulation    Primary Care Physician: Skylar Robledo APRN    Subjective     Chief Complaint   Patient presents with    Shortness of Breath     NEW PT    Dizziness    Chest Pain        History of Present Illness    The patient presents today for an initial visit.     he experienced an episode of extreme fatigue, \"stabbing\" central chest pain, and near-syncope. He could not ambulate more than 10 steps without requiring rest due to dyspnea. He gardens frequently and denies any prior history of allergic rhinitis. The patient denies typical allergic rhinitis symptoms at that time such as rhinorrhea. He was evaluated in the [facility] Emergency Department. A chest CT angiogram demonstrated patchy infiltrate in bilateral lung bases, likely due to poor inspiration and atelectasis, and a 1.2 cm right middle lobe nodule that previously measured 1.3 cm on an abdomen and pelvis CT scan from 11/04/2019. He states that he had bilateral \"reticular nodules\" years ago. His symptoms persisted for 2.5 to 3.5 weeks and have mostly resolved. He notes slight, intermittent dizziness. The patient states that he is less active currently than in the past. He previously bicycled more, including participating in [employer] and [employer] rides. He eats a healthy diet.    The patient denies a personal history of cardiac issues. He has a history of hypertension and hyperlipidemia. He takes lisinopril and recently started hydrochlorothiazide, which has been helpful for decreasing his blood pressure. Today his systolic blood pressure is 124 mmHg. Earlier in 2023, his blood pressure was approximately 158/92 mmHg. He notes that his (total cholesterol?) was previously 215 mg/dL. He recently increased rosuvastatin from 3 times per week to 0.5 tablet daily and denies side effects such as myalgias. The patient takes aspirin 81 mg. He takes lecithin daily for general health.    He " "denies diabetes mellitus.    The patient is a nonsmoker.    He reports an extensive family history of cardiac issues. His father  of myocardial infarction in his 70s. The patient's brother has \"reticular nodules,\" cardiac issues, a \"bad bypass,\" and \"bovine valve replacement at the back of his heart.\" His 73-year-old sister recently underwent \"heart bypass.\"    Review of Systems   Constitutional: Negative for diaphoresis, fever and malaise/fatigue.   HENT:  Negative for congestion.    Eyes:  Negative for vision loss in left eye and vision loss in right eye.   Cardiovascular:  Negative for chest pain, claudication, dyspnea on exertion, irregular heartbeat, leg swelling, orthopnea, palpitations and syncope.   Respiratory:  Negative for cough, shortness of breath and wheezing.    Hematologic/Lymphatic: Negative for adenopathy.   Skin:  Negative for rash.   Musculoskeletal:  Negative for joint pain and joint swelling.   Gastrointestinal:  Negative for abdominal pain, diarrhea, nausea and vomiting.   Neurological:  Positive for dizziness. Negative for excessive daytime sleepiness, focal weakness, light-headedness, numbness and weakness.   Psychiatric/Behavioral:  Negative for depression. The patient does not have insomnia.       Otherwise complete ROS reviewed and negative except as mentioned in the HPI.      Past Medical History:   Past Medical History:   Diagnosis Date    Abdominal mass     Arthritis     Carpal tunnel syndrome     right    Congenital heart defect     Cubital tunnel syndrome, right     right    Hearing loss     Hypertension     Kidney stones     Lung nodules     Mononucleosis     Shingles        Past Surgical History:  Past Surgical History:   Procedure Laterality Date    CARPAL TUNNEL RELEASE Right 2020    Procedure: CARPAL TUNNEL RELEASE;  Surgeon: Gregorio Pringle MD;  Location: [facility];  Service: Neurosurgery;  Laterality: Right;    CATARACT EXTRACTION      COLONOSCOPY N/A " 10/08/2019    Procedure: COLONOSCOPY WITH ANESTHESIA;  Surgeon: Contreras Inman DO;  Location: [facility] ENDOSCOPY;  Service: Gastroenterology    COLONOSCOPY N/A 08/06/2020    Procedure: COLONOSCOPY WITH ANESTHESIA;  Surgeon: Contreras Inman DO;  Location: [facility] ENDOSCOPY;  Service: Gastroenterology;  Laterality: N/A;  Pre:Abnormality of Colon  Post: Abnormality of Colon-Inked at 60cm and 30cm  Sharonda Puneet APRN  CO2 Inflation Used    KNEE SURGERY      1974    ULNAR NERVE DECOMPRESSION Right 06/11/2020    Procedure: CUBITAL TUNNEL RELEASE;  Surgeon: Gregorio Pringle MD;  Location: [facility] OR;  Service: Neurosurgery;  Laterality: Right;    VASECTOMY         Family History: family history includes Cirrhosis in his sister; Heart disease in his father and mother; Kidney disease in his sister.    Social History:  reports that he has never smoked. He has never used smokeless tobacco. He reports current alcohol use of about 1.0 standard drink per week. He reports that he does not use drugs.    Medications:  Prior to Admission medications    Medication Sig Start Date End Date Taking? Authorizing Provider   acyclovir (ZOVIRAX) 400 MG tablet Take 1 tablet by mouth 2 (Two) Times a Day. Take no more than 5 doses a day.   Yes Rigo Ferguson MD   allopurinol (ZYLOPRIM) 100 MG tablet Take 1 tablet by mouth Daily.   Yes Rigo Ferguson MD   cholecalciferol (VITAMIN D3) 1000 units tablet Take 1 tablet by mouth Daily.   Yes Rigo Ferguson MD   dorzolamide (TRUSOPT) 2 % ophthalmic solution 1 drop 3 (Three) Times a Day.   Yes Rgio Ferguson MD   fluorometholone (FML) 0.1 % ophthalmic suspension Administer 1 drop to the right eye 1 (One) Time Per Week.   Yes Rigo Ferguson MD   lisinopril (PRINIVIL,ZESTRIL) 10 MG tablet Take 1 tablet by mouth Daily.   Yes Rigo Ferguson MD   rosuvastatin (CRESTOR) 10 MG tablet Take 1 tablet by mouth 3 (Three) Times a Week.   Yes Rigo Ferguson MD  "  aspirin 81 MG EC tablet Take 1 tablet by mouth Daily. 7/25/23   Wale Shell DO     Allergies:  No Known Allergies    Objective     Vital Signs: /76   Pulse 77   Ht 185.4 cm (72.99\")   Wt 96.6 kg (213 lb)   SpO2 99%   BMI 28.11 kg/m²     Vitals and nursing note reviewed.   Constitutional:       Appearance: Normal and healthy appearance. Well-developed and not in distress.   Eyes:      Extraocular Movements: Extraocular movements intact.      Pupils: Pupils are equal, round, and reactive to light.   HENT:      Head: Normocephalic and atraumatic.    Mouth/Throat:      Pharynx: Oropharynx is clear.   Neck:      Vascular: JVD normal.      Trachea: Trachea normal.   Pulmonary:      Effort: Pulmonary effort is normal.      Breath sounds: Normal breath sounds. No wheezing. No rhonchi. No rales.   Cardiovascular:      PMI at left midclavicular line. Normal rate. Regular rhythm. Normal S1. Normal S2.       Murmurs: There is a grade 2/6 systolic murmur.      No gallop.  No click. No rub.   Pulses:     Dorsalis pedis: 2+ bilaterally.     Posterior tibial: 2+ bilaterally.  Abdominal:      General: Bowel sounds are normal.      Palpations: Abdomen is soft.      Tenderness: There is no abdominal tenderness.   Musculoskeletal: Normal range of motion.      Cervical back: Normal range of motion and neck supple. Skin:     General: Skin is warm and dry.      Capillary Refill: Capillary refill takes less than 2 seconds.   Feet:      Right foot:      Skin integrity: Skin integrity normal.      Left foot:      Skin integrity: Skin integrity normal.   Neurological:      Mental Status: Alert and oriented to person, place and time.      Cranial Nerves: Cranial nerves are intact.      Sensory: Sensation is intact.      Motor: Motor function is intact.      Coordination: Coordination is intact.   Psychiatric:         Speech: Speech normal.         Behavior: Behavior is cooperative.       Results Reviewed:    He " reports undergoing a nuclear stress test at [place] approximately 15 to 16 years ago, which was normal according to the patient.    On 01/29/2019, he underwent transesophageal echocardiogram with Dr. Boo Wylie which noted a small patent foramen ovale with right to left shunting.    [facility] which demonstrated mild left ventricular hypertrophy, normal systolic function, ejection fraction 60 to 65 percent, normal diastolic function, and no significant valve disease.    On 04/03/2023, his total cholesterol was 208 mg/dL, and LDL was 134 mg/dL.    Since the onset of his symptoms, he states that [facility] demonstrated improvements including total cholesterol 168 mg/dL, which was the lowest this has been in 30 years, triglycerides 99 mg/dL, and HDL 99 mg/dL.     His atherosclerotic cardiovascular disease event risk score is currently 17 percent in the next 10 years.    Procedures      No results found for: CHOL, TRIG, HDL, VLDL, LDLHDL  No results found for: HGBA1C    Assessment / Plan        Problem List Items Addressed This Visit          Cardiac and Vasculature    Essential hypertension    Stable angina pectoris - Primary    Relevant Orders    Stress Test With Myocardial Perfusion (1 Day)       Endocrine and Metabolic    Overweight       Family History    Family history of early CAD       Symptoms and Signs    Numbness and tingling in both hands     1. Stable angina pectoris  2. Dizziness  3. Patent foramen ovale     Plan  Stress test will be obtained, and he will be contacted to discuss his results. He was advised to continue his current medications. The patient will follow up in 1 month.      Transcribed from ambient dictation for Wale Shell DO by Chana Danielson.  07/25/23   12:02 CDT    Patient or patient representative verbalized consent to the visit recording.  I have personally performed the services described in this document as transcribed by  the above individual, and it is both accurate and complete.  Wale Shell, DO  7/30/2023  20:43 CDT

## 2023-07-30 PROBLEM — I20.8 STABLE ANGINA PECTORIS: Status: ACTIVE | Noted: 2023-07-30

## 2023-07-30 PROBLEM — I20.89 STABLE ANGINA PECTORIS: Status: ACTIVE | Noted: 2023-07-30

## 2023-08-24 ENCOUNTER — HOSPITAL ENCOUNTER (OUTPATIENT)
Dept: CARDIOLOGY | Facility: HOSPITAL | Age: 73
Discharge: HOME | End: 2023-08-24
Payer: OTHER GOVERNMENT

## 2023-08-24 VITALS
SYSTOLIC BLOOD PRESSURE: 123 MMHG | WEIGHT: 212.96 LBS | HEART RATE: 56 BPM | HEIGHT: 73 IN | DIASTOLIC BLOOD PRESSURE: 78 MMHG | BODY MASS INDEX: 28.22 KG/M2

## 2023-08-24 DIAGNOSIS — I20.8 STABLE ANGINA PECTORIS: ICD-10-CM

## 2023-08-24 PROCEDURE — A9502 TC99M TETROFOSMIN: HCPCS | Performed by: EMERGENCY MEDICINE

## 2023-08-24 PROCEDURE — 0 TECHNETIUM TETROFOSMIN KIT: Performed by: EMERGENCY MEDICINE

## 2023-08-24 PROCEDURE — 93017 CV STRESS TEST TRACING ONLY: CPT

## 2023-08-24 PROCEDURE — 78452 HT MUSCLE IMAGE SPECT MULT: CPT

## 2023-08-24 RX ADMIN — TETROFOSMIN 1 DOSE: 1.38 INJECTION, POWDER, LYOPHILIZED, FOR SOLUTION INTRAVENOUS at 08:08

## 2023-08-24 RX ADMIN — TETROFOSMIN 1 DOSE: 1.38 INJECTION, POWDER, LYOPHILIZED, FOR SOLUTION INTRAVENOUS at 09:52

## 2023-08-25 LAB
BH CV STRESS BP STAGE 1: NORMAL
BH CV STRESS BP STAGE 2: NORMAL
BH CV STRESS BP STAGE 3: NORMAL
BH CV STRESS DURATION MIN STAGE 1: 3
BH CV STRESS DURATION MIN STAGE 2: 3
BH CV STRESS DURATION MIN STAGE 3: 0
BH CV STRESS DURATION SEC STAGE 1: 0
BH CV STRESS DURATION SEC STAGE 2: 0
BH CV STRESS DURATION SEC STAGE 3: 44
BH CV STRESS GRADE STAGE 1: 10
BH CV STRESS GRADE STAGE 2: 12
BH CV STRESS GRADE STAGE 3: 14
BH CV STRESS HR STAGE 1: 99
BH CV STRESS HR STAGE 2: 128
BH CV STRESS HR STAGE 3: 136
BH CV STRESS METS STAGE 1: 5
BH CV STRESS METS STAGE 2: 7.5
BH CV STRESS METS STAGE 3: 10
BH CV STRESS PROTOCOL 1: NORMAL
BH CV STRESS RECOVERY BP: NORMAL MMHG
BH CV STRESS RECOVERY HR: 77 BPM
BH CV STRESS SPEED STAGE 1: 1.7
BH CV STRESS SPEED STAGE 2: 2.5
BH CV STRESS SPEED STAGE 3: 3.4
BH CV STRESS STAGE 1: 1
BH CV STRESS STAGE 2: 2
BH CV STRESS STAGE 3: 3
MAXIMAL PREDICTED HEART RATE: 150 BPM
PERCENT MAX PREDICTED HR: 90.67 %
STRESS BASELINE BP: NORMAL MMHG
STRESS BASELINE HR: 56 BPM
STRESS PERCENT HR: 107 %
STRESS POST ESTIMATED WORKLOAD: 10 METS
STRESS POST EXERCISE DUR MIN: 6 MIN
STRESS POST EXERCISE DUR SEC: 44 SEC
STRESS POST PEAK BP: NORMAL MMHG
STRESS POST PEAK HR: 136 BPM
STRESS TARGET HR: 128 BPM

## 2023-09-05 ENCOUNTER — OFFICE VISIT (OUTPATIENT)
Dept: CARDIOLOGY | Facility: CLINIC | Age: 73
End: 2023-09-05
Payer: OTHER GOVERNMENT

## 2023-09-05 VITALS
SYSTOLIC BLOOD PRESSURE: 130 MMHG | BODY MASS INDEX: 28.23 KG/M2 | DIASTOLIC BLOOD PRESSURE: 78 MMHG | WEIGHT: 213 LBS | OXYGEN SATURATION: 99 % | HEART RATE: 80 BPM | HEIGHT: 73 IN

## 2023-09-05 DIAGNOSIS — Z82.49 FAMILY HISTORY OF EARLY CAD: ICD-10-CM

## 2023-09-05 DIAGNOSIS — I10 ESSENTIAL HYPERTENSION: Primary | ICD-10-CM

## 2023-09-05 DIAGNOSIS — E66.3 OVERWEIGHT: ICD-10-CM

## 2023-09-05 DIAGNOSIS — I20.8 STABLE ANGINA PECTORIS: ICD-10-CM

## 2023-09-05 NOTE — PROGRESS NOTES
"     Subjective:     Encounter Date:09/05/2023      Patient ID: Korey Ruelas is a 72 y.o. male.    Chief Complaint:  History of Present Illness    The patient presents today for a follow-up visit.    The patient reports that his symptoms continued for longer than he though appropriate, though his symptoms subsequently tapered off and then stopped. He denies any further episodes. He suspects that if his symptoms were due to dehydration, they would have resolved after 1 to 2 days. The patient previously noted extreme fatigue, \"stabbing\" in the center of the chest, and presyncope. He could not ambulate more 10 steps without requiring rest due to dyspnea.    The patient takes aspirin 81 mg daily, lisinopril 10 mg daily, and Crestor daily, which he tolerates. He notes that his blood pressure was previously elevated, and he was prescribed (\"water pills,\" no diuretic on med list, unclear if he is actually referring to lisinopril?). His Crestor was increased from 3 times per week to daily. He currently notes well-controlled blood pressure and cholesterol.    His family history includes myocardial infarction in his father in his 70s, coronary bypass and a valve replacement in his brother, and a valve replacement in his sister.        Review of Systems   Constitutional: Negative for diaphoresis, fever and malaise/fatigue.   HENT:  Negative for congestion.    Eyes:  Negative for vision loss in left eye and vision loss in right eye.   Cardiovascular:  Negative for chest pain, claudication, dyspnea on exertion, irregular heartbeat, leg swelling, orthopnea, palpitations and syncope.   Respiratory:  Negative for cough, shortness of breath and wheezing.    Hematologic/Lymphatic: Negative for adenopathy.   Skin:  Negative for rash.   Musculoskeletal:  Negative for joint pain and joint swelling.   Gastrointestinal:  Negative for abdominal pain, diarrhea, nausea and vomiting.   Neurological:  Negative for excessive daytime " sleepiness, dizziness, focal weakness, light-headedness, numbness and weakness.   Psychiatric/Behavioral:  Negative for depression. The patient does not have insomnia.    All other systems reviewed and are negative.        Current Outpatient Medications:     acyclovir (ZOVIRAX) 400 MG tablet, Take 1 tablet by mouth 2 (Two) Times a Day. Take no more than 5 doses a day., Disp: , Rfl:     allopurinol (ZYLOPRIM) 100 MG tablet, Take 1 tablet by mouth Daily., Disp: , Rfl:     aspirin 81 MG EC tablet, Take 1 tablet by mouth Daily., Disp: , Rfl:     cholecalciferol (VITAMIN D3) 1000 units tablet, Take 1 tablet by mouth Daily., Disp: , Rfl:     dorzolamide (TRUSOPT) 2 % ophthalmic solution, 1 drop 3 (Three) Times a Day., Disp: , Rfl:     fluorometholone (FML) 0.1 % ophthalmic suspension, Administer 1 drop to the right eye 1 (One) Time Per Week., Disp: , Rfl:     lisinopril (PRINIVIL,ZESTRIL) 10 MG tablet, Take 1 tablet by mouth Daily., Disp: , Rfl:     rosuvastatin (CRESTOR) 10 MG tablet, Take 1 tablet by mouth Daily., Disp: , Rfl:        Objective:      Vitals:    09/05/23 1052   BP: 130/78   Pulse: 80   SpO2: 99%     Vitals and nursing note reviewed.   Constitutional:       Appearance: Normal and healthy appearance. Well-developed and not in distress.   Eyes:      Extraocular Movements: Extraocular movements intact.      Pupils: Pupils are equal, round, and reactive to light.   HENT:      Head: Normocephalic and atraumatic.    Mouth/Throat:      Pharynx: Oropharynx is clear.   Neck:      Vascular: JVD normal.      Trachea: Trachea normal.   Pulmonary:      Effort: Pulmonary effort is normal.      Breath sounds: Normal breath sounds. No wheezing. No rhonchi. No rales.   Cardiovascular:      PMI at left midclavicular line. Normal rate. Regular rhythm. Normal S1. Normal S2.       Murmurs: There is a grade 2/6 systolic murmur.      No gallop.  No click. No rub.   Pulses:     Dorsalis pedis: 2+ bilaterally.     Posterior  tibial: 2+ bilaterally.  Abdominal:      General: Bowel sounds are normal.      Palpations: Abdomen is soft.      Tenderness: There is no abdominal tenderness.   Musculoskeletal: Normal range of motion.      Cervical back: Normal range of motion and neck supple. Skin:     General: Skin is warm and dry.      Capillary Refill: Capillary refill takes less than 2 seconds.   Feet:      Right foot:      Skin integrity: Skin integrity normal.      Left foot:      Skin integrity: Skin integrity normal.   Neurological:      Mental Status: Alert and oriented to person, place and time.      Cranial Nerves: Cranial nerves are intact.      Sensory: Sensation is intact.      Motor: Motor function is intact.      Coordination: Coordination is intact.   Psychiatric:         Speech: Speech normal.         Behavior: Behavior is cooperative.       Lab Review:       His stress test was low risk for ischemia. There was a moderate-size, mild intensity inferior perfusion defect that was partially present at rest and stress and completely corrected out with attenuation correction software and likely represented artifact. Ejection fraction was normal. He ambulated for nearly 7 minutes on the treadmill. He denies experiencing chest pain during the stress test.    Procedures      Assessment/Plan:     Problem List Items Addressed This Visit          Cardiac and Vasculature    Essential hypertension - Primary    Stable angina pectoris       Endocrine and Metabolic    Overweight       Family History    Family history of early CAD     1. Hypertension      Plan  His cardiac testing is reassuring, and his symptoms have resolved. His prior symptoms may have been related to elevated blood pressure, gastroesophageal reflux, or a strained muscle. He was advised to continue his current medications. The patient will follow up in 1 year or sooner if his symptoms recur.    Recommendations/plans:    Transcribed from ambient dictation for Wale Shell,  DO by Chana Danielson.  09/05/23   14:12 CDT    Patient or patient representative verbalized consent to the visit recording.  I have personally performed the services described in this document as transcribed by the above individual, and it is both accurate and complete.  Wale Shell, DO  9/5/2023  15:50 CDT

## 2024-04-10 NOTE — TELEPHONE ENCOUNTER
Attempted to contact patient with results, LVM advising. Explained to call back with questions.   Iron Deficiency Anemia   AMBULATORY CARE:   Iron deficiency anemia (RUDI)  means you have low red blood cell and hemoglobin levels. Hemoglobin is part of red blood cells and helps carry oxygen to your body. Iron helps make hemoglobin. RUDI is caused by a lack of iron in the blood. Blood loss and not enough iron in the foods you eat are the most common causes of low iron.  Common symptoms include the following:   Feeling weak, tired, or irritable    Pale skin    Headache, dizziness    Shortness of breath with activity    Fast or uneven heartbeat    Sore or swollen tongue and mouth    Nails that break easily    An urge to eat ice, paint, starch, or dirt    Seek care immediately if:   You have dark or bloody bowel movements.    You vomit blood.    You are too dizzy to stand up.    You have trouble swallowing because of the pain in your mouth and throat.    Call your doctor or hematologist if:   You have heartburn, constipation, or diarrhea.    You have nausea or are vomiting.    You are dizzy or very tired.    You have questions or concerns about your condition or care.    Treatment for RUDI  may take 3 to 6 months. You may need medicines and supplements to increase the amount of iron in your blood. Ask your healthcare provider how much iron you should take each day. A blood transfusion may be needed if your anemia is severe. This will help replace the blood and iron you have lost.  Eat foods rich in iron and protein:  Nuts, meat, dark leafy green vegetables, and beans are high in iron and protein. Limit milk to 2 cups a day. The calcium in milk can interfere with how your body absorbs iron. Take the iron supplement with food or a drink that is high in vitamin C. This helps your body absorb the iron. You may need to meet with a dietitian to create the right food plan for you.             Drink liquids as directed:  Iron supplements may cause constipation. Liquids help prevent constipation. Ask how much liquid to drink  each day and which liquids are best for you.  Follow up with your doctor or hematologist as directed:  You may need to see a specialist to help find the cause of your RUDI. Write down your questions so you remember to ask them during your visits.  © Copyright [employer] 2023 Information is for End User's use only and may not be sold, redistributed or otherwise used for commercial purposes.  The above information is an  only. It is not intended as medical advice for individual conditions or treatments. Talk to your doctor, nurse or pharmacist before following any medical regimen to see if it is safe and effective for you.  Well Child Visit at 18 Months   WHAT YOU NEED TO KNOW:   What is a well child visit?  A well child visit is when your child sees a healthcare provider to prevent health problems. Well child visits are used to track your child's growth and development. It is also a time for you to ask questions and to get information on how to keep your child safe. Write down your questions so you remember to ask them. Your child should have regular well child visits from birth to 17 years.  What development milestones may my child reach at 18 months?  Each child develops at his or her own pace. Your child might have already reached the following milestones, or he or she may reach them later:  Say up to 20 words    Point to at least 1 body part, such as an ear or nose    Climb stairs if someone holds his or her hand    Run for short distances    Throw a ball or play with another person    Take off more clothes, such as his or her shirt    Feed himself or herself with a spoon, and use a cup    Pretend to feed a doll or help around the house    Stack 2 to 3 small blocks    What can I do to keep my child safe in the car?   Always place your child in a rear-facing car seat.  Choose a seat that meets the Federal Motor Vehicle Safety Standard 213. Make sure the child safety seat has a harness and clip. Also  make sure that the harness and clips fit snugly against your child. There should be no more than a finger width of space between the strap and your child's chest. Ask your healthcare provider for more information on car safety seats.         Always put your child's car seat in the back seat.  Never put your child's car seat in the front. This will help prevent him or her from being injured in an accident.    What can I do to make my home safe for my child?   Place leo at the top and bottom of stairs.  Always make sure that the gate is closed and locked. Leo will help protect your child from injury. Go up and down stairs with your child to make sure he or she stays safe on the stairs.    Place guards over windows on the second floor or higher.  This will prevent your child from falling out of the window. Keep furniture away from windows. Use cordless window shades, or get cords that do not have loops. You can also cut the loops. A child's head can fall through a looped cord, and the cord can become wrapped around his or her neck.    Secure heavy or large items.  This includes bookshelves, TVs, dressers, cabinets, and lamps. Make sure these items are held in place or nailed into the wall.    Keep all medicines, car supplies, lawn supplies, and cleaning supplies out of your child's reach.  Keep these items in a locked cabinet or closet. Call Poison Help (1-484.184.8392) if your child eats anything that could be harmful.         Keep hot items away from your child.  Turn pot handles toward the back on the stove. Keep hot food and liquid out of your child's reach. Do not hold your child while you have a hot item in your hand or are near a lit stove. Do not leave curling irons or similar items on a counter. Your child may grab for the item and burn his or her hand.    Store and lock all guns and weapons.  Make sure all guns are unloaded before you store them. Make sure your child cannot reach or find where weapons are  kept. Never  leave a loaded gun unattended.    What can I do to keep my child safe in the sun and near water?   Always keep your child within reach near water.  This includes any time you are near ponds, lakes, pools, the ocean, or the bathtub. Never  leave your child alone in the bathtub or sink. A child can drown in less than 1 inch of water.    Put sunscreen on your child.  Ask your healthcare provider which sunscreen is safe for your child. Do not apply sunscreen to your child's eyes, mouth, or hands.    What are other ways I can keep my child safe?   Follow directions on the medicine label when you give your child medicine.  Ask your child's healthcare provider for directions if you do not know how to give the medicine. If your child misses a dose, do not double the next dose. Ask how to make up the missed dose.Do not give aspirin to children younger than 18 years.  Your child could develop Reye syndrome if he or she has the flu or a fever and takes aspirin. Reye syndrome can cause life-threatening brain and liver damage. Check your child's medicine labels for aspirin or salicylates.    Keep plastic bags, latex balloons, and small objects away from your child.  This includes marbles and small toys. These items can cause choking or suffocation. Regularly check the floor for these objects.    Do not let your child use a walker.  Walkers are not safe for your child. Walkers do not help your child learn to walk. Your child can roll down the stairs. Walkers also allow your child to reach higher. Your child might reach for hot drinks, grab pot handles off the stove, or reach for medicines or other unsafe items.    Never leave your child in a room alone.  Make sure there is always a responsible adult with your child.    What do I need to know about nutrition for my child?   Give your child a variety of healthy foods.  Healthy foods include fruits, vegetables, lean meats, and whole grains. Cut all foods into small  pieces. Ask your healthcare provider how much of each type of food your child needs. The following are examples of healthy foods:    Whole grains such as bread, hot or cold cereal, and cooked pasta or rice    Protein from lean meats, chicken, fish, beans, or eggs    Dairy such as whole milk, cheese, or yogurt    Vegetables such as carrots, broccoli, or spinach    Fruits such as strawberries, oranges, apples, or tomatoes       Give your child whole milk until he or she is 2 years old.  Give your child no more than 2 to 3 cups of whole milk each day. His or her body needs the extra fat in whole milk to help him or her grow. After your child turns 2, he or she can drink skim or low-fat milk (such as 1% or 2% milk). Your child's healthcare provider may recommend low-fat milk if your child is overweight.    Limit foods high in fat and sugar.  These foods do not have the nutrients your child needs to be healthy. Food high in fat and sugar include snack foods (potato chips, candy, and other sweets), juice, fruit drinks, and soda. If your child eats these foods often, he or she may eat fewer healthy foods during meals. Your child may gain too much weight.    Do not give your child foods that could cause him or her to choke.  Examples include nuts, popcorn, and hard, raw vegetables. Cut round or hard foods into thin slices. Grapes and hotdogs are examples of round foods. Carrots are an example of hard foods.    Give your child 3 meals and 2 to 3 snacks per day.  Cut all food into small pieces. Examples of healthy snacks include applesauce, bananas, crackers, and cheese.    Encourage your child to feed himself or herself.  Give your child a cup to drink from and spoon to eat with. Be patient with your child. Food may end up on the floor or on your child instead of in his or her mouth. It will take time for him or her to learn how to use a spoon to feed himself or herself.    Have your child eat with other family members.   This gives your child the opportunity to watch and learn how others eat.         Let your child decide how much to eat.  Give your child small portions. Let your child have another serving if he or she asks for one. Your child will be very hungry on some days and want to eat more. For example, your child may want to eat more on days when he or she is more active. Your child may also eat more if he or she is going through a growth spurt. There may be days when he or she eats less than usual.         Know that picky eating is a normal behavior in children under 4 years of age.  Your child may like a certain food on one day and then decide he or she does not like it the next day. He or she may eat only 1 or 2 foods for a whole week or longer. Your child may not like mixed foods, or he or she may not want different foods on the plate to touch. These eating habits are all normal. Continue to offer 2 or 3 different foods at each meal, even if your child is going through this phase.    Offer new foods several times.  At 18 months, your child may mouth or touch foods to try them. Offer foods with different textures and flavors. You may need to offer a new food a few times before your child will like it.    What can I do to keep my child's teeth healthy?   A child younger than 2 years needs to have his or her teeth brushed 2 times each day.  Brush your child's teeth with a children's toothbrush and water. Your child's healthcare provider may recommend that you brush your child's teeth with a small smear of toothpaste with fluoride. Make sure your child spits all of the toothpaste out. Before your child's teeth come in, clean his or her gums and mouth with a soft cloth or infant toothbrush once a day.    Thumb sucking or pacifier use can affect your child's tooth development.  Talk to your child's healthcare provider if your child sucks his or her thumb or uses a pacifier regularly.    Take your child to the dentist  "regularly.  A dentist can make sure your child's teeth and gums are developing properly. Your child may be given a fluoride treatment to prevent cavities. Ask your child's dentist how often he or she needs to visit.    What can I do to create routines for my child?   Have your child take at least 1 nap each day.  Plan the nap early enough in the day so your child is still tired at bedtime. Your child needs 12 to 14 hours of sleep every night.    Create a bedtime routine.  This may include 1 hour of calm and quiet activities before bed. You can read to your child or listen to music. Brush your child's teeth during his or her bedtime routine.    Plan for family time.  Start family traditions such as going for a walk, listening to music, or playing games. Do not watch TV during family time. Have your child play with other family members during family time. Limit time away from home to an hour or less. Your child may become tired if an activity is longer than an hour. Your child may act out or have a tantrum if he or she becomes too tired.    What do I need to know about toilet training?  Toilet training can start between 18 and 24 months of age. Your child will need to be able to stay dry for about 2 hours at a time before you can start toilet training. He or she will also need to know wet and dry. Your child also needs to know when he or she needs to have a bowel movement. You can help your child get ready for toilet training. Read books with your child about how to use the toilet. Take your child into the bathroom with a parent or older brother or sister. Let him or her practice sitting on the toilet with his or her clothes on.  What else can I do to support my child?   Do not punish your child with hitting, spanking, or yelling.  Never  shake your child. Tell your child \"no.\" Give your child short and simple rules. Do not allow your child to hit, kick, or bite another person. Put your child in time-out for 1 to 2 " minutes in his or her crib or playpen. You can distract your child with a new activity when he or she behaves badly. Make sure everyone who cares for your child disciplines him or her the same way.    Be firm and consistent with tantrums.  Temper tantrums are normal at 18 months. Your child may cry, yell, kick, or refuse to do what he or she is told. Stay calm and be firm. Reward your child for good behavior. This will encourage your child to behave well.    Read to your child.  This will comfort your child and help his or her brain develop. Point to pictures as you read. This will help your child make connections between pictures and words. Have other family members or caregivers read to your child. Your child may want to hear the same book over and over. This is normal at 18 months.         Play with your child.  This will help your child develop social skills, motor skills, and speech.    Take your child to play groups or activities.  Let your child play with other children. This will help him or her grow and develop. Your child might not be willing to share his or her toys.    Respect your child's fear of strangers.  It is normal for your child to be afraid of strangers at this age. Do not force your child to talk or play with people he or she does not know. Your child will start to become more independent at 18 months, but he or she may also cling to you around strangers.    Limit your child's TV time as directed.  Your child's brain will develop best through interaction with other people. This includes video chatting through a computer or phone with family or friends. Talk to your child's healthcare provider if you want to let your child watch TV. He or she can help you set healthy limits. Experts usually recommend less than 1 hour of TV per day for children aged 18 months to 2 years. Your provider may also be able to recommend appropriate programs for your child.    Engage with your child if he or she watches  TV.  Do not let your child watch TV alone, if possible. You or another adult should watch with your child. Talk with your child about what he or she is watching. When TV time is done, try to apply what you and your child saw. For example, if your child saw someone counting blocks, have your child count his or her blocks. TV time should never replace active playtime. Turn the TV off when your child plays. Do not let your child watch TV during meals or within 1 hour of bedtime.    What do I need to know about my child's next well child visit?  Your child's healthcare provider will tell you when to bring him or her in again. The next well child visit is usually at 2 years (24 months). Contact your child's healthcare provider if you have questions or concerns about his or her health or care before the next visit. Your child may need vaccines at the next well child visit. Your provider will tell you which vaccines your child needs and when your child should get them.       CARE AGREEMENT:   You have the right to help plan your child's care. Learn about your child's health condition and how it may be treated. Discuss treatment options with your child's healthcare providers to decide what care you want for your child. The above information is an  only. It is not intended as medical advice for individual conditions or treatments. Talk to your doctor, nurse or pharmacist before following any medical regimen to see if it is safe and effective for you.  © Copyright [employer] 2023 Information is for End User's use only and may not be sold, redistributed or otherwise used for commercial purposes.

## 2024-05-14 ENCOUNTER — OFFICE VISIT (OUTPATIENT)
Dept: GASTROENTEROLOGY | Facility: CLINIC | Age: 74
End: 2024-05-14
Payer: OTHER GOVERNMENT

## 2024-05-14 VITALS
WEIGHT: 213 LBS | HEART RATE: 67 BPM | HEIGHT: 73 IN | OXYGEN SATURATION: 98 % | DIASTOLIC BLOOD PRESSURE: 74 MMHG | BODY MASS INDEX: 28.23 KG/M2 | TEMPERATURE: 98 F | SYSTOLIC BLOOD PRESSURE: 130 MMHG

## 2024-05-14 DIAGNOSIS — K76.0 FATTY LIVER: Primary | ICD-10-CM

## 2024-05-14 NOTE — PROGRESS NOTES
"Chief Complaint   Patient presents with    Elevated Hepatic Enzymes     Fatty liver elevated liver enzymes       PCP: Skylar Robledo APRN  REFER: Skylar Robledo A*    Subjective     HPI           Recently had an US/CT which showed a \"fatty liver\"  Denies abdominal pain  Denies a known prev h/o liver dz   Denies weight loss     Past Medical History:   Diagnosis Date    Abdominal mass     Arthritis     Carpal tunnel syndrome     right    Congenital heart defect     Cubital tunnel syndrome, right     right    Hearing loss     Hypertension     Kidney stones     Lung nodules     Mononucleosis     Shingles        Past Surgical History:   Procedure Laterality Date    CARPAL TUNNEL RELEASE Right 06/11/2020    Procedure: CARPAL TUNNEL RELEASE;  Surgeon: Gregorio Pringle MD;  Location: [facility] OR;  Service: Neurosurgery;  Laterality: Right;    CATARACT EXTRACTION      COLONOSCOPY N/A 10/08/2019    Procedure: COLONOSCOPY WITH ANESTHESIA;  Surgeon: Contreras Inman DO;  Location:  [facility];  Service: Gastroenterology    COLONOSCOPY N/A 08/06/2020    Procedure: COLONOSCOPY WITH ANESTHESIA;  Surgeon: Contreras Inman DO;  Location:  [facility];  Service: Gastroenterology;  Laterality: N/A;  Pre:Abnormality of Colon  Post: Abnormality of Colon-Inked at 60cm and 30cm  Sharonda FRANCISCO  CO2 Inflation Used    KNEE SURGERY      1974    ULNAR NERVE DECOMPRESSION Right 06/11/2020    Procedure: CUBITAL TUNNEL RELEASE;  Surgeon: Gregorio Pringle MD;  Location: [facility] OR;  Service: Neurosurgery;  Laterality: Right;    VASECTOMY         Outpatient Medications Marked as Taking for the 5/14/24 encounter (Office Visit) with Contreras Inman DO   Medication Sig Dispense Refill    acyclovir (ZOVIRAX) 400 MG tablet Take 1 tablet by mouth 2 (Two) Times a Day. Take no more than 5 doses a day.      allopurinol (ZYLOPRIM) 100 MG tablet Take 1 tablet by mouth Daily.      aspirin 81 MG EC tablet Take 1 " "tablet by mouth Daily.      cholecalciferol (VITAMIN D3) 1000 units tablet Take 1 tablet by mouth Daily.      dorzolamide (TRUSOPT) 2 % ophthalmic solution 1 drop 3 (Three) Times a Day.      fluorometholone (FML) 0.1 % ophthalmic suspension Administer 1 drop to the right eye 1 (One) Time Per Week.      lisinopril (PRINIVIL,ZESTRIL) 10 MG tablet Take 1 tablet by mouth Daily.      rosuvastatin (CRESTOR) 10 MG tablet Take 1 tablet by mouth Daily.         No Known Allergies    Social History     Socioeconomic History    Marital status:    Tobacco Use    Smoking status: Never    Smokeless tobacco: Never   Vaping Use    Vaping status: Never Used   Substance and Sexual Activity    Alcohol use: Not Currently     Alcohol/week: 1.0 standard drink of alcohol     Types: 1 Cans of beer per week     Comment: nighty    Drug use: No    Sexual activity: Defer       Review of Systems   Constitutional:  Negative for fever and unexpected weight change.   HENT:  Negative for trouble swallowing.    Respiratory:  Negative for shortness of breath.    Cardiovascular:  Negative for chest pain.   Gastrointestinal:  Negative for abdominal pain and anal bleeding.       Objective     Vitals:    05/14/24 1341   BP: 130/74   Pulse: 67   Temp: 98 °F (36.7 °C)   SpO2: 98%   Weight: 96.6 kg (213 lb)   Height: 185.4 cm (73\")     Body mass index is 28.1 kg/m².    Physical Exam  Constitutional:       Appearance: Normal appearance. He is well-developed.   Eyes:      General: No scleral icterus.  Cardiovascular:      Heart sounds: Normal heart sounds. No murmur heard.  Pulmonary:      Effort: Pulmonary effort is normal.   Abdominal:      General: Bowel sounds are normal. There is no distension.      Palpations: Abdomen is soft.      Tenderness: There is no abdominal tenderness. There is no guarding.   Skin:     General: Skin is warm and dry.      Coloration: Skin is not jaundiced.   Neurological:      Mental Status: He is alert.   Psychiatric:    "      Behavior: Behavior is cooperative.         Imaging Results (Most Recent)       None            Body mass index is 28.1 kg/m².    Assessment & Plan     Diagnoses and all orders for this visit:    1. Fatty liver (Primary)       His LFT's are NML at this time  He is avoiding alcohol at this point/which I totally agree with   Will see him prn for this issue as long as his ast/alt/bili remain NML     * Surgery not found *        Advised pt to stop use of NSAIDs, Fish Oil, and MV 5 days prior to procedure, per Dr Inman protocol.  Tylenol based products are ok to take.  Pt verbalized understanding.        Contreras Inman, DO  05/14/24          There are no Patient Instructions on file for this visit.

## 2024-09-09 ENCOUNTER — OFFICE VISIT (OUTPATIENT)
Dept: CARDIOLOGY | Facility: CLINIC | Age: 74
End: 2024-09-09
Payer: OTHER GOVERNMENT

## 2024-09-09 VITALS
WEIGHT: 213 LBS | DIASTOLIC BLOOD PRESSURE: 70 MMHG | BODY MASS INDEX: 28.23 KG/M2 | SYSTOLIC BLOOD PRESSURE: 122 MMHG | OXYGEN SATURATION: 96 % | HEART RATE: 77 BPM | HEIGHT: 73 IN

## 2024-09-09 DIAGNOSIS — I25.84 CORONARY ARTERY CALCIFICATION: ICD-10-CM

## 2024-09-09 DIAGNOSIS — I25.10 CORONARY ARTERY CALCIFICATION: ICD-10-CM

## 2024-09-09 DIAGNOSIS — I10 ESSENTIAL HYPERTENSION: Primary | ICD-10-CM

## 2024-09-15 PROBLEM — I25.84 CORONARY ARTERY CALCIFICATION: Status: ACTIVE | Noted: 2024-09-15

## 2024-09-15 PROBLEM — I25.10 CORONARY ARTERY CALCIFICATION: Status: ACTIVE | Noted: 2024-09-15

## 2024-09-15 NOTE — PROGRESS NOTES
[facility] - CARDIOLOGY  New Patient Initial Outpatient Evaulation    Primary Care Physician: Skylar Robledo APRN    Subjective     Chief Complaint   Patient presents with    Hypertension     1 YR FU        History of Present Illness  History of Present Illness  The patient is a 74-year-old male who presents for evaluation of coronary calcifications.    He reports that his blood pressure readings have been consistently normal during his regular check-ups. He is seeking advice on measures to mitigate the risk associated with coronary calcifications. He is interested in understanding if exercise could help manage his condition.    He denies experiencing any chest pain, shortness of breath, dizziness, lightheadedness, or fainting spells. He also does not report any instances of palpitations, such as skipped beats, fluttering, or unexplained rapid heart rate.    He mentions having lung issues for which he is under the care of a pulmonologist. A CT scan was performed last month.    He recalls experiencing pain in the past year, which he attributes to a possible muscle strain.    He had a suspected case of endocarditis 5 years ago, which was later ruled out. An examination revealed a hole in his heart, but no other issues were identified. He believes the initial diagnosis was sepsis, which led to the suspicion of endocarditis.    He has a family history of heart disease.    Review of Systems   Constitutional: Negative for diaphoresis, fever and malaise/fatigue.   HENT:  Negative for congestion.    Eyes:  Negative for vision loss in left eye and vision loss in right eye.   Cardiovascular:  Negative for chest pain, claudication, dyspnea on exertion, irregular heartbeat, leg swelling, orthopnea, palpitations and syncope.   Respiratory:  Negative for cough, shortness of breath and wheezing.    Hematologic/Lymphatic: Negative for adenopathy.   Skin:  Negative for rash.   Musculoskeletal:  Negative for joint pain and joint  swelling.   Gastrointestinal:  Negative for abdominal pain, diarrhea, nausea and vomiting.   Neurological:  Negative for excessive daytime sleepiness, dizziness, focal weakness, light-headedness, numbness and weakness.   Psychiatric/Behavioral:  Negative for depression. The patient does not have insomnia.         Otherwise complete ROS reviewed and negative except as mentioned in the HPI.      Past Medical History:   Past Medical History:   Diagnosis Date    Abdominal mass     Arthritis     Carpal tunnel syndrome     right    Congenital heart defect     Cubital tunnel syndrome, right     right    Hearing loss     Hypertension     Kidney stones     Lung nodules     Mononucleosis     Shingles        Past Surgical History:  Past Surgical History:   Procedure Laterality Date    CARPAL TUNNEL RELEASE Right 06/11/2020    Procedure: CARPAL TUNNEL RELEASE;  Surgeon: Gregorio Pringle MD;  Location: [facility] OR;  Service: Neurosurgery;  Laterality: Right;    CATARACT EXTRACTION      COLONOSCOPY N/A 10/08/2019    Procedure: COLONOSCOPY WITH ANESTHESIA;  Surgeon: Contreras Inman DO;  Location: [facility];  Service: Gastroenterology    COLONOSCOPY N/A 08/06/2020    Procedure: COLONOSCOPY WITH ANESTHESIA;  Surgeon: Contreras Inman DO;  Location: [facility];  Service: Gastroenterology;  Laterality: N/A;  Pre:Abnormality of Colon  Post: Abnormality of Colon-Inked at 60cm and 30cm  Sharonda RFANCISCO  CO2 Inflation Used    KNEE SURGERY      1974    ULNAR NERVE DECOMPRESSION Right 06/11/2020    Procedure: CUBITAL TUNNEL RELEASE;  Surgeon: Gregorio Pringle MD;  Location: [facility] OR;  Service: Neurosurgery;  Laterality: Right;    VASECTOMY         Family History: family history includes Cirrhosis in his sister; Heart disease in his father and mother; Kidney disease in his sister.    Social History:  reports that he has never smoked. He has never used smokeless tobacco. He reports that he does not currently  "use alcohol after a past usage of about 1.0 standard drink of alcohol per week. He reports that he does not use drugs.    Medications:  Prior to Admission medications    Medication Sig Start Date End Date Taking? Authorizing Provider   acyclovir (ZOVIRAX) 400 MG tablet Take 1 tablet by mouth 2 (Two) Times a Day. Take no more than 5 doses a day.   Yes Rigo Ferguson MD   allopurinol (ZYLOPRIM) 100 MG tablet Take 1 tablet by mouth Daily.   Yes Rigo Ferguson MD   aspirin 81 MG EC tablet Take 1 tablet by mouth Daily. 7/25/23  Yes Wale Shell,    cholecalciferol (VITAMIN D3) 1000 units tablet Take 1 tablet by mouth Daily.   Yes Rigo Ferguson MD   dorzolamide (TRUSOPT) 2 % ophthalmic solution 1 drop 3 (Three) Times a Day.   Yes Rigo Ferguson MD   fluorometholone (FML) 0.1 % ophthalmic suspension Administer 1 drop to the right eye 1 (One) Time Per Week.   Yes Rigo Ferguson MD   lisinopril (PRINIVIL,ZESTRIL) 10 MG tablet Take 1 tablet by mouth Daily.   Yes Rigo Ferguson MD   rosuvastatin (CRESTOR) 10 MG tablet Take 1 tablet by mouth Daily.   Yes Rigo Ferguson MD     Allergies:  No Known Allergies    Objective     Vital Signs: /70   Pulse 77   Ht 185.4 cm (72.99\")   Wt 96.6 kg (213 lb)   SpO2 96%   BMI 28.11 kg/m²     Vitals and nursing note reviewed.   Constitutional:       Appearance: Normal and healthy appearance. Well-developed and not in distress.   Eyes:      Extraocular Movements: Extraocular movements intact.      Pupils: Pupils are equal, round, and reactive to light.   HENT:      Head: Normocephalic and atraumatic.    Mouth/Throat:      Pharynx: Oropharynx is clear.   Neck:      Vascular: JVD normal.      Trachea: Trachea normal.   Pulmonary:      Effort: Pulmonary effort is normal.      Breath sounds: Normal breath sounds. No wheezing. No rhonchi. No rales.   Cardiovascular:      PMI at left midclavicular line. Normal rate. Regular " "rhythm. Normal S1. Normal S2.       Murmurs: There is a grade 2/6 systolic murmur.      No gallop.  No click. No rub.   Pulses:     Dorsalis pedis: 2+ bilaterally.     Posterior tibial: 2+ bilaterally.  Abdominal:      General: Bowel sounds are normal.      Palpations: Abdomen is soft.      Tenderness: There is no abdominal tenderness.   Musculoskeletal: Normal range of motion.      Cervical back: Normal range of motion and neck supple. Skin:     General: Skin is warm and dry.      Capillary Refill: Capillary refill takes less than 2 seconds.   Feet:      Right foot:      Skin integrity: Skin integrity normal.      Left foot:      Skin integrity: Skin integrity normal.   Neurological:      Mental Status: Alert and oriented to person, place and time.      Sensory: Sensation is intact.      Motor: Motor function is intact.      Coordination: Coordination is intact.   Psychiatric:         Speech: Speech normal.         Behavior: Behavior is cooperative.       Physical Exam      Results Reviewed:      ECG 12 Lead    Date/Time: 9/15/2024 3:28 PM  Performed by: Wale Shell DO    Authorized by: Wale Shell DO  Comparison: compared with previous ECG   Similar to previous ECG  Rhythm: sinus rhythm    Clinical impression: normal ECG          Results  Imaging  CAT scan shows aorta and pulmonary arteries of normal size, but coronary calcifications consistent with age. Stress test results were normal.    No results found for: \"CHOL\", \"TRIG\", \"HDL\", \"VLDL\", \"LDLHDL\"  No results found for: \"HGBA1C\"    Assessment / Plan        Problem List Items Addressed This Visit       Essential hypertension - Primary    Coronary artery calcification     Assessment & Plan  1. Coronary Calcifications.  The patient's blood pressure and heart rate are within normal ranges. The EKG results are also normal. He was advised to maintain his current medication regimen, which includes aspirin and Crestor. He was also " encouraged to incorporate 20 to 30 minutes of cardiovascular exercise into his routine, aiming to get his heart rate up to around 120-130 bpm during exercise.     2. Suspected Endocarditis.  The patient had suspected endocarditis 5 years ago, which was ruled out after further investigation. There are no current symptoms or findings indicating endocarditis.    Follow-up  The patient will follow up in 1 year.      Transcribed from ambient dictation for Wale Shell DO by Wale Shell DO.  09/15/24   15:27 CDT    Patient or patient representative verbalized consent for the use of Ambient Listening during the visit with  Wale Shell DO for chart documentation. 9/15/2024  15:29 CDT

## 2024-11-11 NOTE — PROGRESS NOTES
Indications:  Elevated PSA    Pre-operative prep:  fleets enema, oral antibiotic, IM gentamicin and stopped aspirin, coumadin, and other anticoagulants    Last PSA:  5.698    Procedure:    After proper identification of patient and procedure, patient was placed in the left later decubitus position.  2% lidocaine jelly was instilled per rectum  for topical anesthesia.  The ultrasound probe was gently inserted per rectum. Prostate was scanned from the base of the bladder to the apex.  20 cc of 1% lidocaine plain was then used to perform a prostate nerve block injecting the junction of the seminal vesicle and bladder laterally.      Prostate length: 5.38 cm    Prostate width: 4.32 cm    Prostate height: 3.24 cm    Prostate volume: 39.3 cc    PSA density: 0.14    Abnormal findings:  Periurethral calcifications and Hypoechoic lesions right and left mid    Median lobe:  no    A total of 12 biopsies were taken from the base, apex, and mid portion of the gland on both the right and the left sides.     Patient tolerated the procedure well    Complications: none    Estimated blood loss: minimal    Mr. Ruelas was given instructions for follow up.  He will notify the office if he has excessive hematuria, hematochezia, fevers, perineal, or abdominal pain.    Follow up:   Call with pathology results and schedule follow up.     48.9

## (undated) DEVICE — Device: Brand: DEFENDO AIR/WATER/SUCTION AND BIOPSY VALVE

## (undated) DEVICE — YANKAUER,BULB TIP WITH VENT: Brand: ARGYLE

## (undated) DEVICE — CONN FLX BREATHE CIRCT

## (undated) DEVICE — SENSR O2 OXIMAX FNGR A/ 18IN NONSTR

## (undated) DEVICE — GLV SURG SENSICARE W/ALOE PF LF 7.5 STRL

## (undated) DEVICE — ENDOGATOR AUXILIARY WATER JET CONNECTOR: Brand: ENDOGATOR

## (undated) DEVICE — SPNG GZ WOVN 4X4IN 12PLY 10/BX STRL

## (undated) DEVICE — DRSNG GZ CURAD XEROFORM NONADHS 5X9IN STRL

## (undated) DEVICE — SUT MNCRYL 4/0 PS2 27IN UD MCP426H

## (undated) DEVICE — SUT NUROLON 3/0 RB1 CR8 18IN C553D

## (undated) DEVICE — FRCP BX RADJAW4 NDL 2.8 240 STD OG

## (undated) DEVICE — SURGICAL SUCTION CONNECTING TUBE WITH MALE CONNECTOR AND SUCTION CLAMP, 2 FT. LONG (.6 M), 5 MM I.D.: Brand: CONMED

## (undated) DEVICE — GLV SURG BIOGEL LTX PF 6 1/2

## (undated) DEVICE — ELECTRD BLD EDGE/INSUL1P 2.4X5.1MM STRL

## (undated) DEVICE — Device: Brand: SPOT EX ENDOSCOPIC TATTOO

## (undated) DEVICE — THE SINGLE USE ETRAP – POLYP TRAP IS USED FOR SUCTION RETRIEVAL OF ENDOSCOPICALLY REMOVED POLYPS.: Brand: ETRAP

## (undated) DEVICE — GAUZE,SPONGE,FLUFF,6"X6.75",STRL,10/TRAY: Brand: MEDLINE

## (undated) DEVICE — GLV SURG BIOGEL M LTX PF 7

## (undated) DEVICE — NDL SCLEROTHRPY INTERJECT 25G 4 240 CLR

## (undated) DEVICE — SUT ETHLN 3/0 FS1 30IN 669H

## (undated) DEVICE — DRAPE,HAND,STERILE: Brand: MEDLINE

## (undated) DEVICE — DISPOSABLE IRRIGATION BIPOLAR CORD, M1000 TYPE: Brand: KIRWAN

## (undated) DEVICE — SYR CONTRL LUERLOK 10CC

## (undated) DEVICE — ARM SLING II: Brand: DEROYAL

## (undated) DEVICE — PROXIMATE RH ROTATING HEAD SKIN STAPLERS (35 REGULAR) CONTAINS 35 STAINLESS STEEL STAPLES: Brand: PROXIMATE

## (undated) DEVICE — SUT ETHLN 3/0 PSLX 30IN 1683H

## (undated) DEVICE — SNAR POLYP CAPTIVATOR MICROHEX 13 240CM

## (undated) DEVICE — ADHS LIQ MASTISOL 2/3ML

## (undated) DEVICE — 3M™ IOBAN™ 2 ANTIMICROBIAL INCISE DRAPE 6650EZ: Brand: IOBAN™ 2

## (undated) DEVICE — SUT VIC 4/0 P3 18IN UD VCP494H

## (undated) DEVICE — THE CHANNEL CLEANING BRUSH IS A NYLON FLEXI BRUSH ATTACHED TO A FLEXIBLE PLASTIC SHEATH DESIGNED TO SAFELY REMOVE DEBRIS FROM FLEXIBLE ENDOSCOPES.

## (undated) DEVICE — BANDAGE,GAUZE,BULKEE II,4.5"X4.1YD,STRL: Brand: MEDLINE

## (undated) DEVICE — MASK,OXYGEN,MED CONC,ADLT,7' TUB, UC: Brand: PENDING

## (undated) DEVICE — TBG SMPL FLTR LINE NASL 02/C02 A/ BX/100

## (undated) DEVICE — 3M™ STERI-STRIP™ REINFORCED ADHESIVE SKIN CLOSURES, R1547, 1/2 IN X 4 IN (12 MM X 100 MM), 6 STRIPS/ENVELOPE: Brand: 3M™ STERI-STRIP™

## (undated) DEVICE — BNDG ELAS ECON W/CLIP 3IN 5YD LF STRL

## (undated) DEVICE — COVER,MAYO STAND,STERILE: Brand: MEDLINE

## (undated) DEVICE — GLV SURG GRN DERMASSURE LF PF 7.5

## (undated) DEVICE — BNDG ELAS CO-FLEX SLF ADHR 4IN5YD LF STRL

## (undated) DEVICE — DRSNG TELFA PAD NONADH STR 1S 3X8IN

## (undated) DEVICE — LP VESL MAXI 2.5X1MM RED 2PK

## (undated) DEVICE — PK EXTRM 30

## (undated) DEVICE — CODMAN® SURGICAL PATTIES 1/2" X1 1/2" (1.27CM X 3.81CM): Brand: CODMAN®

## (undated) DEVICE — PK TURNOVER RM ADV

## (undated) DEVICE — NDL HYPO PRECISIONGLIDE REG 22G 1 1/2

## (undated) DEVICE — ANTIBACTERIAL UNDYED BRAIDED (POLYGLACTIN 910), SYNTHETIC ABSORBABLE SUTURE: Brand: COATED VICRYL